# Patient Record
Sex: FEMALE | Race: BLACK OR AFRICAN AMERICAN | Employment: FULL TIME | URBAN - METROPOLITAN AREA
[De-identification: names, ages, dates, MRNs, and addresses within clinical notes are randomized per-mention and may not be internally consistent; named-entity substitution may affect disease eponyms.]

---

## 2017-12-31 ENCOUNTER — HOSPITAL ENCOUNTER (EMERGENCY)
Age: 42
Discharge: HOME | End: 2017-12-31
Attending: EMERGENCY MEDICINE
Payer: COMMERCIAL

## 2017-12-31 VITALS
SYSTOLIC BLOOD PRESSURE: 150 MMHG | WEIGHT: 158 LBS | RESPIRATION RATE: 16 BRPM | TEMPERATURE: 98.7 F | DIASTOLIC BLOOD PRESSURE: 101 MMHG | OXYGEN SATURATION: 99 % | HEIGHT: 59 IN | HEART RATE: 85 BPM | BODY MASS INDEX: 31.85 KG/M2
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
## 2017-12-31 DIAGNOSIS — S16.1XXA STRAIN OF NECK MUSCLE, INITIAL ENCOUNTER: Primary | ICD-10-CM

## 2017-12-31 PROCEDURE — 99282 EMERGENCY DEPT VISIT SF MDM: CPT

## 2017-12-31 RX ORDER — CELECOXIB 200 MG/1
200 CAPSULE ORAL DAILY
COMMUNITY

## 2017-12-31 RX ORDER — LANOLIN ALCOHOL/MO/W.PET/CERES
1000 CREAM (GRAM) TOPICAL DAILY
COMMUNITY

## 2017-12-31 RX ORDER — OMEPRAZOLE 10 MG/1
10 CAPSULE, DELAYED RELEASE ORAL DAILY
COMMUNITY
End: 2018-04-12

## 2017-12-31 RX ORDER — CYCLOBENZAPRINE HCL 10 MG
10 TABLET ORAL
Qty: 7 TAB | Refills: 0 | Status: SHIPPED | OUTPATIENT
Start: 2017-12-31 | End: 2018-01-07
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
## 2017-12-31 NOTE — LETTER
[facility] [place] 
[facility] 
[address] [address] 
[phone] Work/School Note Date: 12/31/2017 To Whom It May concern: Jimmie Barney was seen and treated today in the emergency room by the following provider(s): 
Attending Provider: Adriana Amato MD 
Physician Assistant: Sahil Younger. Jimmie Barney may return to work on 1/1/18.  
 
Sincerely, 
 
 
 
 
 
 
[employer], MANDO

## 2019-12-30 ENCOUNTER — HOSPITAL ENCOUNTER (EMERGENCY)
Age: 44
Discharge: HOME | End: 2019-12-30
Attending: EMERGENCY MEDICINE
Payer: MEDICAID

## 2019-12-30 VITALS
HEART RATE: 89 BPM | HEIGHT: 57 IN | OXYGEN SATURATION: 100 % | RESPIRATION RATE: 16 BRPM | DIASTOLIC BLOOD PRESSURE: 74 MMHG | TEMPERATURE: 98.8 F | SYSTOLIC BLOOD PRESSURE: 126 MMHG | WEIGHT: 143 LBS | BODY MASS INDEX: 30.85 KG/M2

## 2019-12-30 DIAGNOSIS — J01.90 ACUTE NON-RECURRENT SINUSITIS, UNSPECIFIED LOCATION: Primary | ICD-10-CM

## 2019-12-30 PROCEDURE — 99282 EMERGENCY DEPT VISIT SF MDM: CPT

## 2019-12-30 RX ORDER — AMOXICILLIN AND CLAVULANATE POTASSIUM 875; 125 MG/1; MG/1
1 TABLET, FILM COATED ORAL 2 TIMES DAILY
Qty: 20 TAB | Refills: 0 | Status: SHIPPED | OUTPATIENT
Start: 2019-12-30 | End: 2020-01-09

## 2019-12-30 NOTE — LETTER
[facility] [place] 
[facility] 
[address] [facility] [address] [phone] Work/School Note Date: 12/30/2019 To Whom It May concern: Kelsey Hurst was seen and treated today in the emergency room by the following provider(s): 
Attending Provider: aYsmeen Locke MD 
Physician Assistant: EDDIE Pa. Kelsey Hurst may return to work on 1/1/20.  
 
Sincerely, 
 
 
 
 
Derrel EDDIE Reeder

## 2019-12-30 NOTE — DISCHARGE INSTRUCTIONS
Patient Education        Sinusitis: Care Instructions  Your Care Instructions    Sinusitis is an infection of the lining of the sinus cavities in your head. Sinusitis often follows a cold. It causes pain and pressure in your head and face. In most cases, sinusitis gets better on its own in 1 to 2 weeks. But some mild symptoms may last for several weeks. Sometimes antibiotics are needed. Follow-up care is a key part of your treatment and safety. Be sure to make and go to all appointments, and call your doctor if you are having problems. It's also a good idea to know your test results and keep a list of the medicines you take. How can you care for yourself at home? · Take an over-the-counter pain medicine, such as acetaminophen (Tylenol), ibuprofen (Advil, Motrin), or naproxen (Aleve). Read and follow all instructions on the label. · If the doctor prescribed antibiotics, take them as directed. Do not stop taking them just because you feel better. You need to take the full course of antibiotics. · Be careful when taking over-the-counter cold or flu medicines and Tylenol at the same time. Many of these medicines have acetaminophen, which is Tylenol. Read the labels to make sure that you are not taking more than the recommended dose. Too much acetaminophen (Tylenol) can be harmful. · Breathe warm, moist air from a steamy shower, a hot bath, or a sink filled with hot water. Avoid cold, dry air. Using a humidifier in your home may help. Follow the directions for cleaning the machine. · Use saline (saltwater) nasal washes to help keep your nasal passages open and wash out mucus and bacteria. You can buy saline nose drops at a grocery store or drugstore. Or you can make your own at home by adding 1 teaspoon of salt and 1 teaspoon of baking soda to 2 cups of distilled water. If you make your own, fill a bulb syringe with the solution, insert the tip into your nostril, and squeeze gently. Nyoka Mano your nose.   · Put a hot, wet towel or a warm gel pack on your face 3 or 4 times a day for 5 to 10 minutes each time. · Try a decongestant nasal spray like oxymetazoline (Afrin). Do not use it for more than 3 days in a row. Using it for more than 3 days can make your congestion worse. When should you call for help? Call your doctor now or seek immediate medical care if:    · You have new or worse swelling or redness in your face or around your eyes.     · You have a new or higher fever.    Watch closely for changes in your health, and be sure to contact your doctor if:    · You have new or worse facial pain.     · The mucus from your nose becomes thicker (like pus) or has new blood in it.     · You are not getting better as expected. Where can you learn more? Go to http://mike-kell.info/. Enter M709 in the search box to learn more about \"Sinusitis: Care Instructions. \"  Current as of: October 21, 2018  Content Version: 12.2  © 3665-2954 [employer]. Care instructions adapted under license by [facility] [employer] [facility] (which disclaims liability or warranty for this information). If you have questions about a medical condition or this instruction, always ask your healthcare professional. [address] any warranty or liability for your use of this information.

## 2019-12-30 NOTE — ED TRIAGE NOTES
Pt states in written form \" I think I have a sinus infection I have a headache , yellow green phlegm with cough chest pain and feels hard to breath. I have body aches sweats sore throat,  fatique, lost my voice also diarrhea that started thursday.

## 2019-12-30 NOTE — ED PROVIDER NOTES
EMERGENCY DEPARTMENT HISTORY AND PHYSICAL EXAM    Date: 12/30/2019  Patient Name: Santos Scherer    History of Presenting Illness     Chief Complaint   Patient presents with    Sinus Infection         History Provided By: Patient    10:34 AM  Santos Scherer is a 38 y.o. female with PMHX of migraines rheumatoid arthritis, Optitz-Sarah syndrome who presents to the emergency department C/O nasal congestion, postnasal drip and cough and subjective fever and body aches onset 5 days ago. Patient states her nasal discharge was initially clear but now yellow-green and today was tinged with blood. She reports history of 1-2 sinus infections per year but has not required any sinus surgeries. She states most of her symptoms are improving but has worsening sinus pressure and pain. Has been taking Flonase and over-the-counter cold medications without relief. Pt denies fever, vomiting, sore throat, and any other sxs or complaints. PCP: Bianca Castellano MD    Current Outpatient Medications   Medication Sig Dispense Refill    amoxicillin-clavulanate (AUGMENTIN) 875-125 mg per tablet Take 1 Tab by mouth two (2) times a day for 10 days. 20 Tab 0    tolterodine ER (DETROL LA) 4 mg ER capsule Take 1 Cap by mouth daily. Indications: Urine Leakage When there is a Strong Desire to Void 90 Cap 3    busPIRone (BUSPAR) 5 mg tablet TAKE 1 TABLET BY MOUTH 3 TIMES DAILY. 5    fluticasone (VERAMYST) 27.5 mcg/actuation nasal spray 55 mcg.  amitriptyline (ELAVIL) 10 mg tablet TAKE 1 TABLET BY MOUTH EVERY NIGHT AT BEDTIME  4    ergocalciferol (ERGOCALCIFEROL) 50,000 unit capsule TAKE ONE CAPSULE BY MOUTH EVERY WEEK  3    norethindrone-e.estradiol-iron (LO LOESTRIN FE) 1 mg-10 mcg (24)/10 mcg (2) tab Take 1 Tab by mouth daily.  celecoxib (CELEBREX) 200 mg capsule Take 200 mg by mouth daily.  cyanocobalamin 1,000 mcg tablet Take 1,000 mcg by mouth daily.       albuterol (PROVENTIL HFA, VENTOLIN HFA, PROAIR HFA) 90 mcg/actuation inhaler Take  by inhalation.  fexofenadine (ALLEGRA) 180 mg tablet Take  by mouth daily. Past History     Past Medical History:  Past Medical History:   Diagnosis Date    Asthma     Blurred vision     Carpal tunnel syndrome     GERD (gastroesophageal reflux disease)     Hearing loss     IBS (irritable bowel syndrome)     Kidney stones     Opitz-Sarah syndrome     Other ill-defined conditions(799.89)     sinus, arthritis, bone disease    PAD (peripheral artery disease) (HCC)     RA (rheumatoid arthritis) ([address]     Urinary frequency     Urinary urgency     Varicose vein        Past Surgical History:  Past Surgical History:   Procedure Laterality Date    HX CARPAL TUNNEL RELEASE      HX CATARACT REMOVAL      HX HERNIA REPAIR      HX UROLOGICAL      Biopsy of urethra       Family History:  History reviewed. No pertinent family history. Social History:  Social History     Tobacco Use    Smoking status: Never Smoker    Smokeless tobacco: Never Used   Substance Use Topics    Alcohol use: No     Comment: occ    Drug use: No       Allergies: Allergies   Allergen Reactions    Coconut Rash and Itching     GI distress      Coconut      Other reaction(s): gi distress, mild rash/itching    Coconut Oil Other (comments)     Other reaction(s): gi distress, mild rash/itching    Nuts [Tree Nut] Other (comments)     States found out in allergy testing    Pecan Extract Other (comments)     Other reaction(s): unknown  Positive reaction to allergy skin testing    Pecan Nut Unknown (comments)     Positive reaction to allergy skin testing    Tree Nuts Other (comments)     Other reaction(s): Other (comments)  States found out in allergy testing         Review of Systems   Review of Systems   Constitutional: Positive for chills and fever. HENT: Positive for congestion, sinus pressure and sinus pain. Negative for sore throat. Respiratory: Positive for cough.     All other systems reviewed and are negative. Physical Exam     Vitals:    12/30/19 0944   BP: 126/74   Pulse: 89   Resp: 16   Temp: 98.8 °F (37.1 °C)   SpO2: 100%   Weight: 64.9 kg (143 lb)   Height: 4' 9\" (1.448 m)     Physical Exam  Vital signs and nursing notes reviewed. CONSTITUTIONAL: Alert. Well-appearing; well-nourished; in no apparent distress. HEAD: Normocephalic; atraumatic. EYES: PERRL; Conjunctiva clear. ENT: TM's normal. External ear normal. Normal nose; no rhinorrhea. Normal pharynx. Moist mucus membranes. Bilateral maxillary sinus tenderness. NECK: Supple; FROM without difficulty, non-tender; no cervical lymphadenopathy. CV: Normal S1, S2; no murmurs, rubs, or gallops. No chest wall tenderness. RESPIRATORY: Normal chest excursion with respiration; breath sounds clear and equal bilaterally; no wheezes, rhonchi, or rales. SKIN: Normal for age and race; warm; dry; good turgor; no apparent lesions or exudate. NEURO: A & O x3. PSYCH:  Mood and affect appropriate. Diagnostic Study Results     Labs -   No results found for this or any previous visit (from the past 12 hour(s)). Radiologic Studies -   No orders to display     CT Results  (Last 48 hours)    None        CXR Results  (Last 48 hours)    None          Medications given in the ED-  Medications - No data to display      Medical Decision Making   I am the first provider for this patient. I reviewed the vital signs, available nursing notes, past medical history, past surgical history, family history and social history. Vital Signs-Reviewed the patient's vital signs. Records Reviewed: Nursing Notes      Procedures:  Procedures    ED Course:  10:34 AM   Initial assessment performed. The patients presenting problems have been discussed, and they are in agreement with the care plan formulated and outlined with them. I have encouraged them to ask questions as they arise throughout their visit.         Provider Notes (Medical Decision Making): Diamond Bishop is a 38 y.o. female with 5 days of URI symptoms. Now with increasing sinus pain and pressure and colored discharge from nose. Her vitals are stable and exam unremarkable except for some maxillary sinus tenderness. For this reason we will have her continue Flonase, over-the-counter decongestants and expectorants and start on Augmentin for possible bacterial sinusitis. Follow-up with PCP. Diagnosis and Disposition       DISCHARGE NOTE:    Margy King's  results have been reviewed with her. She has been counseled regarding her diagnosis, treatment, and plan. She verbally conveys understanding and agreement of the signs, symptoms, diagnosis, treatment and prognosis and additionally agrees to follow up as discussed. She also agrees with the care-plan and conveys that all of her questions have been answered. I have also provided discharge instructions for her that include: educational information regarding their diagnosis and treatment, and list of reasons why they would want to return to the ED prior to their follow-up appointment, should her condition change. She has been provided with education for proper emergency department utilization. CLINICAL IMPRESSION:    1. Acute non-recurrent sinusitis, unspecified location        PLAN:  1. D/C Home  2. Current Discharge Medication List      START taking these medications    Details   amoxicillin-clavulanate (AUGMENTIN) 875-125 mg per tablet Take 1 Tab by mouth two (2) times a day for 10 days. Qty: 20 Tab, Refills: 0           3.    Follow-up Information     Follow up With Specialties Details Why Contact Info    Elenita Gallo MD [facility] Schedule an appointment as soon as possible for a visit  [address] [place] [address] EMERGENCY DEPT Emergency Medicine  As needed, If symptoms worsen [address] [address] [phone] _______________________________      Please note that this dictation was completed with [employer], the computer voice recognition software. Quite often unanticipated grammatical, syntax, homophones, and other interpretive errors are inadvertently transcribed by the computer software. Please disregard these errors. Please excuse any errors that have escaped final proofreading.

## 2020-12-28 ENCOUNTER — HOSPITAL ENCOUNTER (EMERGENCY)
Age: 45
Discharge: HOME | End: 2020-12-28
Attending: EMERGENCY MEDICINE
Payer: MEDICAID

## 2020-12-28 ENCOUNTER — APPOINTMENT (OUTPATIENT)
Dept: GENERAL RADIOLOGY | Age: 45
End: 2020-12-28
Attending: EMERGENCY MEDICINE
Payer: MEDICAID

## 2020-12-28 VITALS
WEIGHT: 150 LBS | HEART RATE: 90 BPM | DIASTOLIC BLOOD PRESSURE: 63 MMHG | HEIGHT: 59 IN | RESPIRATION RATE: 27 BRPM | SYSTOLIC BLOOD PRESSURE: 105 MMHG | BODY MASS INDEX: 30.24 KG/M2 | OXYGEN SATURATION: 100 % | TEMPERATURE: 98.8 F

## 2020-12-28 DIAGNOSIS — N30.00 ACUTE CYSTITIS WITHOUT HEMATURIA: Primary | ICD-10-CM

## 2020-12-28 DIAGNOSIS — E87.6 HYPOKALEMIA: ICD-10-CM

## 2020-12-28 LAB
ALBUMIN SERPL-MCNC: 3.6 G/DL (ref 3.4–5)
ALBUMIN/GLOB SERPL: 0.9 {RATIO} (ref 0.8–1.7)
ALP SERPL-CCNC: 73 U/L (ref 45–117)
ALT SERPL-CCNC: 43 U/L (ref 13–56)
ANION GAP SERPL CALC-SCNC: 6 MMOL/L (ref 3–18)
APPEARANCE UR: CLEAR
AST SERPL-CCNC: 28 U/L (ref 10–38)
BACTERIA URNS QL MICRO: ABNORMAL /HPF
BASOPHILS # BLD: 0 K/UL (ref 0–0.1)
BASOPHILS NFR BLD: 0 % (ref 0–2)
BILIRUB SERPL-MCNC: 0.2 MG/DL (ref 0.2–1)
BILIRUB UR QL: NEGATIVE
BUN SERPL-MCNC: 9 MG/DL (ref 7–18)
BUN/CREAT SERPL: 13 (ref 12–20)
CALCIUM SERPL-MCNC: 9.1 MG/DL (ref 8.5–10.1)
CHLORIDE SERPL-SCNC: 102 MMOL/L (ref 100–111)
CK MB CFR SERPL CALC: NORMAL % (ref 0–4)
CK MB SERPL-MCNC: <1 NG/ML (ref 5–25)
CK SERPL-CCNC: 90 U/L (ref 26–192)
CO2 SERPL-SCNC: 30 MMOL/L (ref 21–32)
COLOR UR: ABNORMAL
CREAT SERPL-MCNC: 0.71 MG/DL (ref 0.6–1.3)
DIFFERENTIAL METHOD BLD: ABNORMAL
EOSINOPHIL # BLD: 0 K/UL (ref 0–0.4)
EOSINOPHIL NFR BLD: 0 % (ref 0–5)
EPITH CASTS URNS QL MICRO: ABNORMAL /LPF (ref 0–5)
ERYTHROCYTE [DISTWIDTH] IN BLOOD BY AUTOMATED COUNT: 13.3 % (ref 11.6–14.5)
GLOBULIN SER CALC-MCNC: 4.2 G/DL (ref 2–4)
GLUCOSE SERPL-MCNC: 107 MG/DL (ref 74–99)
GLUCOSE UR STRIP.AUTO-MCNC: 100 MG/DL
HCT VFR BLD AUTO: 42.5 % (ref 35–45)
HGB BLD-MCNC: 15 G/DL (ref 12–16)
HGB UR QL STRIP: ABNORMAL
KETONES UR QL STRIP.AUTO: 40 MG/DL
LEUKOCYTE ESTERASE UR QL STRIP.AUTO: NEGATIVE
LIPASE SERPL-CCNC: 141 U/L (ref 73–393)
LYMPHOCYTES # BLD: 2 K/UL (ref 0.9–3.6)
LYMPHOCYTES NFR BLD: 36 % (ref 21–52)
MCH RBC QN AUTO: 32.4 PG (ref 24–34)
MCHC RBC AUTO-ENTMCNC: 35.3 G/DL (ref 31–37)
MCV RBC AUTO: 91.8 FL (ref 74–97)
MONOCYTES # BLD: 0.6 K/UL (ref 0.05–1.2)
MONOCYTES NFR BLD: 10 % (ref 3–10)
MUCOUS THREADS URNS QL MICRO: ABNORMAL /LPF
NEUTS SEG # BLD: 3 K/UL (ref 1.8–8)
NEUTS SEG NFR BLD: 54 % (ref 40–73)
NITRITE UR QL STRIP.AUTO: NEGATIVE
PH UR STRIP: 6.5 [PH] (ref 5–8)
PLATELET # BLD AUTO: 235 K/UL (ref 135–420)
PMV BLD AUTO: 9.1 FL (ref 9.2–11.8)
POTASSIUM SERPL-SCNC: 3.2 MMOL/L (ref 3.5–5.5)
PROT SERPL-MCNC: 7.8 G/DL (ref 6.4–8.2)
PROT UR STRIP-MCNC: ABNORMAL MG/DL
RBC # BLD AUTO: 4.63 M/UL (ref 4.2–5.3)
RBC #/AREA URNS HPF: ABNORMAL /HPF (ref 0–5)
SODIUM SERPL-SCNC: 138 MMOL/L (ref 136–145)
SP GR UR REFRACTOMETRY: 1.02 (ref 1–1.03)
TROPONIN I SERPL-MCNC: <0.02 NG/ML (ref 0–0.04)
UROBILINOGEN UR QL STRIP.AUTO: 1 EU/DL (ref 0.2–1)
WBC # BLD AUTO: 5.5 K/UL (ref 4.6–13.2)
WBC URNS QL MICRO: ABNORMAL /HPF (ref 0–5)

## 2020-12-28 PROCEDURE — 99285 EMERGENCY DEPT VISIT HI MDM: CPT

## 2020-12-28 PROCEDURE — 96361 HYDRATE IV INFUSION ADD-ON: CPT

## 2020-12-28 PROCEDURE — 85025 COMPLETE CBC W/AUTO DIFF WBC: CPT

## 2020-12-28 PROCEDURE — 74011250637 HC RX REV CODE- 250/637: Performed by: EMERGENCY MEDICINE

## 2020-12-28 PROCEDURE — 74011250636 HC RX REV CODE- 250/636: Performed by: EMERGENCY MEDICINE

## 2020-12-28 PROCEDURE — 81001 URINALYSIS AUTO W/SCOPE: CPT

## 2020-12-28 PROCEDURE — 71045 X-RAY EXAM CHEST 1 VIEW: CPT

## 2020-12-28 PROCEDURE — 96374 THER/PROPH/DIAG INJ IV PUSH: CPT

## 2020-12-28 PROCEDURE — 80053 COMPREHEN METABOLIC PANEL: CPT

## 2020-12-28 PROCEDURE — 93005 ELECTROCARDIOGRAM TRACING: CPT

## 2020-12-28 PROCEDURE — 82550 ASSAY OF CK (CPK): CPT

## 2020-12-28 PROCEDURE — 83690 ASSAY OF LIPASE: CPT

## 2020-12-28 RX ORDER — CEPHALEXIN 500 MG/1
500 CAPSULE ORAL 2 TIMES DAILY
Qty: 14 CAP | Refills: 0 | Status: SHIPPED | OUTPATIENT
Start: 2020-12-28 | End: 2021-01-04

## 2020-12-28 RX ORDER — METOCLOPRAMIDE HYDROCHLORIDE 5 MG/ML
10 INJECTION INTRAMUSCULAR; INTRAVENOUS
Status: COMPLETED | OUTPATIENT
Start: 2020-12-28 | End: 2020-12-28

## 2020-12-28 RX ORDER — POTASSIUM CHLORIDE 1500 MG/1
40 TABLET, FILM COATED, EXTENDED RELEASE ORAL DAILY
Qty: 14 TAB | Refills: 0 | Status: SHIPPED | OUTPATIENT
Start: 2020-12-28 | End: 2021-01-04

## 2020-12-28 RX ORDER — DICYCLOMINE HYDROCHLORIDE 10 MG/1
20 CAPSULE ORAL
Status: COMPLETED | OUTPATIENT
Start: 2020-12-28 | End: 2020-12-28

## 2020-12-28 RX ORDER — ACETAMINOPHEN 500 MG
1000 TABLET ORAL
Status: COMPLETED | OUTPATIENT
Start: 2020-12-28 | End: 2020-12-28

## 2020-12-28 RX ADMIN — SODIUM CHLORIDE 1000 ML: 900 INJECTION, SOLUTION INTRAVENOUS at 10:58

## 2020-12-28 RX ADMIN — METOCLOPRAMIDE 10 MG: 5 INJECTION, SOLUTION INTRAMUSCULAR; INTRAVENOUS at 11:35

## 2020-12-28 RX ADMIN — DICYCLOMINE HYDROCHLORIDE 20 MG: 10 CAPSULE ORAL at 11:35

## 2020-12-28 RX ADMIN — ACETAMINOPHEN 1000 MG: 500 TABLET ORAL at 11:59

## 2020-12-28 NOTE — ED TRIAGE NOTES
Pt w/ c/o recurrent fever, cough, mid abdominal pain, nausea and headache x1 week. 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 Pt was seen on Sat at [facility] and tested neg for Flu but has had recurrent fevers of 101, treated w/ tylenol and motrin. Pt reports fever this morning of 100 but did not take any medications PTA. Pt adds, while walking to ED bed,SOB, worsening w/ exertion and intermittent chest pain.

## 2020-12-28 NOTE — ED PROVIDER NOTES
EMERGENCY DEPARTMENT HISTORY AND PHYSICAL EXAM    Date: 12/28/2020  Patient Name: Leon Rodriguez    History of Presenting Illness     Chief Complaint   Patient presents with    Fever       History Provided By: Patient     History Joetatiannagreta Barba):   10:40 AM  Leon Rodriguez is a 41 y.o. female with PMHX of Asthma, GERD, IBS, Leverne Javi Sarah syndrome, peripheral arterial disease, RA who presents to the emergency department C/O fever onset 1 week ago. Associated sxs include headache, nausea. Pt denies any other sxs or complaints. Chief Complaint: Fever  Duration: 1 week  Timing: Acute  Location: Generalized  Quality: Warm  Severity: Moderate  Modifying Factors: Nothing makes it better, or worse. Associated Symptoms: Headache, nausea    PCP: Scarlet Carl MD     Current Outpatient Medications   Medication Sig Dispense Refill    cephALEXin (Keflex) 500 mg capsule Take 1 Cap by mouth two (2) times a day for 7 days. 14 Cap 0    potassium chloride SR (K-TAB) 20 mEq tablet Take 2 Tabs by mouth daily for 7 days. 14 Tab 0    tolterodine ER (DETROL LA) 4 mg ER capsule Take 1 Cap by mouth daily. Indications: Urine Leakage When there is a Strong Desire to Void 90 Cap 3    busPIRone (BUSPAR) 5 mg tablet TAKE 1 TABLET BY MOUTH 3 TIMES DAILY. 5    fluticasone (VERAMYST) 27.5 mcg/actuation nasal spray 55 mcg.  amitriptyline (ELAVIL) 10 mg tablet TAKE 1 TABLET BY MOUTH EVERY NIGHT AT BEDTIME  4    ergocalciferol (ERGOCALCIFEROL) 50,000 unit capsule TAKE ONE CAPSULE BY MOUTH EVERY WEEK  3    norethindrone-e.estradiol-iron (LO LOESTRIN FE) 1 mg-10 mcg (24)/10 mcg (2) tab Take 1 Tab by mouth daily.  celecoxib (CELEBREX) 200 mg capsule Take 200 mg by mouth daily.  cyanocobalamin 1,000 mcg tablet Take 1,000 mcg by mouth daily.  albuterol (PROVENTIL HFA, VENTOLIN HFA, PROAIR HFA) 90 mcg/actuation inhaler Take  by inhalation.  fexofenadine (ALLEGRA) 180 mg tablet Take  by mouth daily.          Past History Past Medical History:  Past Medical History:   Diagnosis Date    Asthma     Blurred vision     Carpal tunnel syndrome     GERD (gastroesophageal reflux disease)     Hearing loss     IBS (irritable bowel syndrome)     Kidney stones     Opitz-Sarah syndrome     Other ill-defined conditions(799.89)     sinus, arthritis, bone disease    [facility]     RA (rheumatoid arthritis) ([facility]     Urinary frequency     Urinary urgency     Varicose vein        Past Surgical History:  Past Surgical History:   Procedure Laterality Date    HX CARPAL TUNNEL RELEASE      HX CATARACT REMOVAL      HX HERNIA REPAIR      HX UROLOGICAL      Biopsy of urethra       Family History:  History reviewed. No pertinent family history. Social History:  Social History     Tobacco Use    Smoking status: Never Smoker    Smokeless tobacco: Never Used   Substance Use Topics    Alcohol use: Not Currently     Comment: occ    Drug use: No       Allergies: Allergies   Allergen Reactions    Coconut Rash and Itching     GI distress      Coconut      Other reaction(s): gi distress, mild rash/itching    Coconut Oil Other (comments)     Other reaction(s): gi distress, mild rash/itching    Nuts [Tree Nut] Other (comments)     States found out in allergy testing    Pecan Extract Other (comments)     Other reaction(s): unknown  Positive reaction to allergy skin testing    Pecan Nut Unknown (comments)     Positive reaction to allergy skin testing    Tree Nuts Other (comments)     Other reaction(s): Other (comments)  States found out in allergy testing         Review of Systems     Review of Systems   Constitutional: Positive for fever. Negative for chills. HENT: Negative for congestion, rhinorrhea and sore throat. Eyes: Negative for pain and visual disturbance. Respiratory: Negative for cough, shortness of breath and wheezing. Cardiovascular: Negative for chest pain and palpitations. Gastrointestinal: Positive for nausea. Negative for abdominal pain, diarrhea and vomiting. Genitourinary: Negative for dysuria, flank pain, frequency and urgency. Musculoskeletal: Negative for arthralgias and myalgias. Skin: Negative for rash and wound. Neurological: Positive for headaches. Negative for speech difficulty, weakness and light-headedness. Psychiatric/Behavioral: Negative for agitation and confusion. All other systems reviewed and are negative. Physical Exam     Vitals:    12/28/20 1221 12/28/20 1222 12/28/20 1223 12/28/20 1224   BP:       Pulse: 93 92 90 85   Resp: 27 29 25 26   Temp:       SpO2: 100% 100% 100% 100%   Weight:       Height:           Physical Exam  Vitals signs and nursing note reviewed. Constitutional:       General: She is not in acute distress. Appearance: Normal appearance. She is normal weight. She is not ill-appearing. HENT:      Head: Normocephalic and atraumatic. Nose: Nose normal. No rhinorrhea. Mouth/Throat:      Mouth: Mucous membranes are moist.      Pharynx: No oropharyngeal exudate or posterior oropharyngeal erythema. Eyes:      Extraocular Movements: Extraocular movements intact. Conjunctiva/sclera: Conjunctivae normal.      Pupils: Pupils are equal, round, and reactive to light. Neck:      Musculoskeletal: Normal range of motion and neck supple. No neck rigidity. Cardiovascular:      Rate and Rhythm: Normal rate and regular rhythm. Heart sounds: No murmur. No friction rub. No gallop. Pulmonary:      Effort: Pulmonary effort is normal. No respiratory distress. Breath sounds: Normal breath sounds. No wheezing, rhonchi or rales. Abdominal:      General: Bowel sounds are normal.      Palpations: Abdomen is soft. Tenderness: There is no abdominal tenderness. There is no guarding or rebound. Musculoskeletal: Normal range of motion. General: No swelling, tenderness or deformity.    Lymphadenopathy: Cervical: No cervical adenopathy. Skin:     General: Skin is warm and dry. Findings: No rash. Neurological:      General: No focal deficit present. Mental Status: She is alert and oriented to person, place, and time. Psychiatric:         Mood and Affect: Mood normal.         Behavior: Behavior normal.         Diagnostic Study Results     Labs -     Recent Results (from the past 12 hour(s))   EKG, 12 LEAD, INITIAL    Collection Time: 12/28/20 10:11 AM   Result Value Ref Range    Ventricular Rate 110 BPM    Atrial Rate 110 BPM    P-R Interval 150 ms    QRS Duration 76 ms    Q-T Interval 336 ms    QTC Calculation (Bezet) 454 ms    Calculated P Axis 58 degrees    Calculated R Axis 17 degrees    Calculated T Axis 30 degrees    Diagnosis       Sinus tachycardia  Otherwise normal ECG  When compared with ECG of 22-MAR-2010 10:20,  No significant change was found     CBC WITH AUTOMATED DIFF    Collection Time: 12/28/20 10:22 AM   Result Value Ref Range    WBC 5.5 4.6 - 13.2 K/uL    RBC 4.63 4.20 - 5.30 M/uL    HGB 15.0 12.0 - 16.0 g/dL    HCT 42.5 35.0 - 45.0 %    MCV 91.8 74.0 - 97.0 FL    MCH 32.4 24.0 - 34.0 PG    MCHC 35.3 31.0 - 37.0 g/dL    RDW 13.3 11.6 - 14.5 %    PLATELET 758 887 - 565 K/uL    MPV 9.1 (L) 9.2 - 11.8 FL    NEUTROPHILS 54 40 - 73 %    LYMPHOCYTES 36 21 - 52 %    MONOCYTES 10 3 - 10 %    EOSINOPHILS 0 0 - 5 %    BASOPHILS 0 0 - 2 %    ABS. NEUTROPHILS 3.0 1.8 - 8.0 K/UL    ABS. LYMPHOCYTES 2.0 0.9 - 3.6 K/UL    ABS. MONOCYTES 0.6 0.05 - 1.2 K/UL    ABS. EOSINOPHILS 0.0 0.0 - 0.4 K/UL    ABS.  BASOPHILS 0.0 0.0 - 0.1 K/UL    DF AUTOMATED     METABOLIC PANEL, COMPREHENSIVE    Collection Time: 12/28/20 10:22 AM   Result Value Ref Range    Sodium 138 136 - 145 mmol/L    Potassium 3.2 (L) 3.5 - 5.5 mmol/L    Chloride 102 100 - 111 mmol/L    CO2 30 21 - 32 mmol/L    Anion gap 6 3.0 - 18 mmol/L    Glucose 107 (H) 74 - 99 mg/dL    BUN 9 7.0 - 18 MG/DL    Creatinine 0.71 0.6 - 1.3 MG/DL BUN/Creatinine ratio 13 12 - 20      GFR est AA >60 >60 ml/min/1.73m2    GFR est non-AA >60 >60 ml/min/1.73m2    Calcium 9.1 8.5 - 10.1 MG/DL    Bilirubin, total 0.2 0.2 - 1.0 MG/DL    ALT (SGPT) 43 13 - 56 U/L    AST (SGOT) 28 10 - 38 U/L    Alk. phosphatase 73 45 - 117 U/L    Protein, total 7.8 6.4 - 8.2 g/dL    Albumin 3.6 3.4 - 5.0 g/dL    Globulin 4.2 (H) 2.0 - 4.0 g/dL    A-G Ratio 0.9 0.8 - 1.7     CARDIAC PANEL,(CK, CKMB & TROPONIN)    Collection Time: 12/28/20 10:22 AM   Result Value Ref Range    CK - MB <1.0 <3.6 ng/ml    CK-MB Index  0.0 - 4.0 %     CALCULATION NOT PERFORMED WHEN RESULT IS BELOW LINEAR LIMIT    CK 90 26 - 192 U/L    Troponin-I, QT <0.02 0.0 - 0.045 NG/ML   LIPASE    Collection Time: 12/28/20 10:22 AM   Result Value Ref Range    Lipase 141 73 - 393 U/L   URINALYSIS W/ RFLX MICROSCOPIC    Collection Time: 12/28/20 11:32 AM   Result Value Ref Range    Color DARK YELLOW      Appearance CLEAR      Specific gravity 1.024 1.005 - 1.030      pH (UA) 6.5 5.0 - 8.0      Protein TRACE (A) NEG mg/dL    Glucose 100 (A) NEG mg/dL    Ketone 40 (A) NEG mg/dL    Bilirubin Negative NEG      Blood TRACE (A) NEG      Urobilinogen 1.0 0.2 - 1.0 EU/dL    Nitrites Negative NEG      Leukocyte Esterase Negative NEG     URINE MICROSCOPIC ONLY    Collection Time: 12/28/20 11:32 AM   Result Value Ref Range    WBC 0 to 3 0 - 5 /hpf    RBC 4 to 10 0 - 5 /hpf    Epithelial cells 1+ 0 - 5 /lpf    Bacteria FEW (A) NEG /hpf    Mucus 1+ (A) NEG /lpf       Radiologic Studies -   XR CHEST PORT   Final Result   IMPRESSION:      No acute radiographic cardiopulmonary abnormality. CT Results  (Last 48 hours)    None        CXR Results  (Last 48 hours)               12/28/20 1039  XR CHEST PORT Final result    Impression:  IMPRESSION:       No acute radiographic cardiopulmonary abnormality.         Narrative:  EXAM: XR CHEST PORT       CLINICAL INDICATION/HISTORY: cough   -Additional: None       COMPARISON: March 22, 2010 TECHNIQUE: Frontal view of the chest       _______________       FINDINGS:       HEART AND MEDIASTINUM: Normal cardiac size and mediastinal contours. LUNGS AND PLEURAL SPACES: No focal pneumonic consolidation, pneumothorax, or   pleural effusion. BONY THORAX AND SOFT TISSUES: No acute osseous abnormality       _______________                 Medications given in the ED-  Medications   sodium chloride 0.9 % bolus infusion 1,000 mL (1,000 mL IntraVENous New Bag 12/28/20 1058)   metoclopramide HCl (REGLAN) injection 10 mg (10 mg IntraVENous Given 12/28/20 1135)   dicyclomine (BENTYL) capsule 20 mg (20 mg Oral Given 12/28/20 1135)   acetaminophen (TYLENOL) tablet 1,000 mg (1,000 mg Oral Given 12/28/20 1159)         Medical Decision Making   I am the first provider for this patient. I reviewed the vital signs, available nursing notes, past medical history, past surgical history, family history and social history. Vital Signs-Reviewed the patient's vital signs. Pulse Oximetry Analysis - 100% on RA     Cardiac Monitor:  Rate: 114 bpm  Rhythm: sinus tachycardia    EKG interpretation: (Preliminary)  Rhythm: sinus tachycardia. Rate: 110 bpm; no STEMI  EKG read by Carroll Martin MD at 10:11 AM     Records Reviewed: Nursing Notes    Provider Notes (Medical Decision Making):   DDX: URI, pneumonia, intra-abdominal infection, electrolyte disorder, UTI    Procedures:  Procedures    ED Course:   10:40 AM Initial assessment performed. The patients presenting problems have been discussed, and they are in agreement with the care plan formulated and outlined with them. I have encouraged them to ask questions as they arise throughout their visit. Diagnosis and Disposition     Discussion:  41 y.o. female with 1 week of fever with headache and nausea. Patient had a UTI found on ED evaluation. This to be treated as an outpatient. No indication for admission or further evaluation in the ED.   Patient also had a mild hypokalemia which will also be treated as an outpatient. Patient will follow up with her primary care doctor as instructed. Patient understands and agrees with this plan. DISCHARGE NOTE:  12:42 PM   Wayne King's  results have been reviewed with her. She has been counseled regarding her diagnosis, treatment, and plan. She verbally conveys understanding and agreement of the signs, symptoms, diagnosis, treatment and prognosis and additionally agrees to follow up as discussed. She also agrees with the care-plan and conveys that all of her questions have been answered. I have also provided discharge instructions for her that include: educational information regarding their diagnosis and treatment, and list of reasons why they would want to return to the ED prior to their follow-up appointment, should her condition change. She has been provided with education for proper emergency department utilization. CLINICAL IMPRESSION:    1. Acute cystitis without hematuria    2. Hypokalemia        PLAN:  1. D/C Home  2. Current Discharge Medication List      START taking these medications    Details   cephALEXin (Keflex) 500 mg capsule Take 1 Cap by mouth two (2) times a day for 7 days. Qty: 14 Cap, Refills: 0      potassium chloride SR (K-TAB) 20 mEq tablet Take 2 Tabs by mouth daily for 7 days. Qty: 14 Tab, Refills: 0           3. Follow-up Information     Follow up With Specialties Details Why Contact Info    Lee Dumont MD [facility] Schedule an appointment as soon as possible for a visit in 1 week For follow up from Emergency Department visit. [address] [facility] [address]  [phone]      [facility] EMERGENCY DEPT Emergency Medicine  As needed; If symptoms worsen [address] [address] [address]     Please note that this dictation was completed with [employer], the [employer] voice recognition software.   Quite often unanticipated grammatical, syntax, homophones, and other interpretive errors are inadvertently transcribed by the computer software. Please disregard these errors. Please excuse any errors that have escaped final proofreading.     Briana Barrera MD

## 2020-12-29 LAB
ATRIAL RATE: 110 BPM
CALCULATED P AXIS, ECG09: 58 DEGREES
CALCULATED R AXIS, ECG10: 17 DEGREES
CALCULATED T AXIS, ECG11: 30 DEGREES
DIAGNOSIS, 93000: NORMAL
P-R INTERVAL, ECG05: 150 MS
Q-T INTERVAL, ECG07: 336 MS
QRS DURATION, ECG06: 76 MS
QTC CALCULATION (BEZET), ECG08: 454 MS
VENTRICULAR RATE, ECG03: 110 BPM

## 2021-01-07 ENCOUNTER — APPOINTMENT (OUTPATIENT)
Dept: GENERAL RADIOLOGY | Age: 46
End: 2021-01-07
Attending: PHYSICIAN ASSISTANT
Payer: MEDICAID

## 2021-01-07 ENCOUNTER — HOSPITAL ENCOUNTER (EMERGENCY)
Age: 46
Discharge: HOME | End: 2021-01-07
Attending: EMERGENCY MEDICINE
Payer: MEDICAID

## 2021-01-07 VITALS
BODY MASS INDEX: 30.24 KG/M2 | WEIGHT: 150 LBS | OXYGEN SATURATION: 99 % | DIASTOLIC BLOOD PRESSURE: 69 MMHG | TEMPERATURE: 97.1 F | SYSTOLIC BLOOD PRESSURE: 106 MMHG | HEART RATE: 76 BPM | HEIGHT: 59 IN | RESPIRATION RATE: 23 BRPM

## 2021-01-07 DIAGNOSIS — U07.1 COVID-19 VIRUS INFECTION: ICD-10-CM

## 2021-01-07 DIAGNOSIS — J45.41 MODERATE PERSISTENT ASTHMA WITH ACUTE EXACERBATION: Primary | ICD-10-CM

## 2021-01-07 DIAGNOSIS — J98.01 ACUTE BRONCHOSPASM: ICD-10-CM

## 2021-01-07 LAB
ALBUMIN SERPL-MCNC: 3.5 G/DL (ref 3.4–5)
ALBUMIN/GLOB SERPL: 0.8 {RATIO} (ref 0.8–1.7)
ALP SERPL-CCNC: 72 U/L (ref 45–117)
ALT SERPL-CCNC: 32 U/L (ref 13–56)
ANION GAP SERPL CALC-SCNC: 8 MMOL/L (ref 3–18)
APPEARANCE UR: CLEAR
AST SERPL-CCNC: 25 U/L (ref 10–38)
ATRIAL RATE: 84 BPM
BASOPHILS # BLD: 0 K/UL (ref 0–0.1)
BASOPHILS NFR BLD: 0 % (ref 0–2)
BILIRUB SERPL-MCNC: 0.3 MG/DL (ref 0.2–1)
BILIRUB UR QL: NEGATIVE
BUN SERPL-MCNC: 10 MG/DL (ref 7–18)
BUN/CREAT SERPL: 14 (ref 12–20)
CALCIUM SERPL-MCNC: 9.9 MG/DL (ref 8.5–10.1)
CALCULATED P AXIS, ECG09: 42 DEGREES
CALCULATED R AXIS, ECG10: 10 DEGREES
CALCULATED T AXIS, ECG11: 16 DEGREES
CHLORIDE SERPL-SCNC: 101 MMOL/L (ref 100–111)
CK MB CFR SERPL CALC: NORMAL % (ref 0–4)
CK MB SERPL-MCNC: <1 NG/ML (ref 5–25)
CK SERPL-CCNC: 87 U/L (ref 26–192)
CO2 SERPL-SCNC: 28 MMOL/L (ref 21–32)
COLOR UR: YELLOW
CREAT SERPL-MCNC: 0.73 MG/DL (ref 0.6–1.3)
D DIMER PPP FEU-MCNC: <0.27 UG/ML(FEU)
DIAGNOSIS, 93000: NORMAL
DIFFERENTIAL METHOD BLD: ABNORMAL
EOSINOPHIL # BLD: 0.1 K/UL (ref 0–0.4)
EOSINOPHIL NFR BLD: 1 % (ref 0–5)
ERYTHROCYTE [DISTWIDTH] IN BLOOD BY AUTOMATED COUNT: 12.5 % (ref 11.6–14.5)
GLOBULIN SER CALC-MCNC: 4.4 G/DL (ref 2–4)
GLUCOSE SERPL-MCNC: 130 MG/DL (ref 74–99)
GLUCOSE UR STRIP.AUTO-MCNC: 250 MG/DL
HCT VFR BLD AUTO: 41.6 % (ref 35–45)
HGB BLD-MCNC: 14.1 G/DL (ref 12–16)
HGB UR QL STRIP: NEGATIVE
KETONES UR QL STRIP.AUTO: NEGATIVE MG/DL
LEUKOCYTE ESTERASE UR QL STRIP.AUTO: NEGATIVE
LYMPHOCYTES # BLD: 2.7 K/UL (ref 0.9–3.6)
LYMPHOCYTES NFR BLD: 49 % (ref 21–52)
MAGNESIUM SERPL-MCNC: 2 MG/DL (ref 1.6–2.6)
MCH RBC QN AUTO: 31.6 PG (ref 24–34)
MCHC RBC AUTO-ENTMCNC: 33.9 G/DL (ref 31–37)
MCV RBC AUTO: 93.3 FL (ref 74–97)
MONOCYTES # BLD: 0.6 K/UL (ref 0.05–1.2)
MONOCYTES NFR BLD: 10 % (ref 3–10)
NEUTS SEG # BLD: 2.2 K/UL (ref 1.8–8)
NEUTS SEG NFR BLD: 40 % (ref 40–73)
NITRITE UR QL STRIP.AUTO: NEGATIVE
P-R INTERVAL, ECG05: 162 MS
PH UR STRIP: 7 [PH] (ref 5–8)
PLATELET # BLD AUTO: 450 K/UL (ref 135–420)
PMV BLD AUTO: 9.2 FL (ref 9.2–11.8)
POTASSIUM SERPL-SCNC: 3.4 MMOL/L (ref 3.5–5.5)
PROT SERPL-MCNC: 7.9 G/DL (ref 6.4–8.2)
PROT UR STRIP-MCNC: NEGATIVE MG/DL
Q-T INTERVAL, ECG07: 368 MS
QRS DURATION, ECG06: 78 MS
QTC CALCULATION (BEZET), ECG08: 434 MS
RBC # BLD AUTO: 4.46 M/UL (ref 4.2–5.3)
SODIUM SERPL-SCNC: 137 MMOL/L (ref 136–145)
SP GR UR REFRACTOMETRY: 1.01 (ref 1–1.03)
TROPONIN I SERPL-MCNC: <0.02 NG/ML (ref 0–0.04)
UROBILINOGEN UR QL STRIP.AUTO: 1 EU/DL (ref 0.2–1)
VENTRICULAR RATE, ECG03: 84 BPM
WBC # BLD AUTO: 5.5 K/UL (ref 4.6–13.2)

## 2021-01-07 PROCEDURE — 82553 CREATINE MB FRACTION: CPT

## 2021-01-07 PROCEDURE — 80053 COMPREHEN METABOLIC PANEL: CPT

## 2021-01-07 PROCEDURE — 85379 FIBRIN DEGRADATION QUANT: CPT

## 2021-01-07 PROCEDURE — 74011250636 HC RX REV CODE- 250/636: Performed by: PHYSICIAN ASSISTANT

## 2021-01-07 PROCEDURE — 74011250637 HC RX REV CODE- 250/637: Performed by: PHYSICIAN ASSISTANT

## 2021-01-07 PROCEDURE — 71045 X-RAY EXAM CHEST 1 VIEW: CPT

## 2021-01-07 PROCEDURE — 93005 ELECTROCARDIOGRAM TRACING: CPT

## 2021-01-07 PROCEDURE — 83735 ASSAY OF MAGNESIUM: CPT

## 2021-01-07 PROCEDURE — 96375 TX/PRO/DX INJ NEW DRUG ADDON: CPT

## 2021-01-07 PROCEDURE — 81003 URINALYSIS AUTO W/O SCOPE: CPT

## 2021-01-07 PROCEDURE — 99285 EMERGENCY DEPT VISIT HI MDM: CPT

## 2021-01-07 PROCEDURE — 96365 THER/PROPH/DIAG IV INF INIT: CPT

## 2021-01-07 PROCEDURE — 85025 COMPLETE CBC W/AUTO DIFF WBC: CPT

## 2021-01-07 RX ORDER — DEXAMETHASONE SODIUM PHOSPHATE 4 MG/ML
10 INJECTION, SOLUTION INTRA-ARTICULAR; INTRALESIONAL; INTRAMUSCULAR; INTRAVENOUS; SOFT TISSUE
Status: COMPLETED | OUTPATIENT
Start: 2021-01-07 | End: 2021-01-07

## 2021-01-07 RX ORDER — MAGNESIUM SULFATE HEPTAHYDRATE 40 MG/ML
2 INJECTION, SOLUTION INTRAVENOUS ONCE
Status: COMPLETED | OUTPATIENT
Start: 2021-01-07 | End: 2021-01-07

## 2021-01-07 RX ORDER — ALBUTEROL SULFATE 90 UG/1
2 AEROSOL, METERED RESPIRATORY (INHALATION)
Qty: 1 INHALER | Refills: 1 | Status: SHIPPED | OUTPATIENT
Start: 2021-01-07

## 2021-01-07 RX ORDER — CODEINE PHOSPHATE AND GUAIFENESIN 10; 100 MG/5ML; MG/5ML
10 SOLUTION ORAL ONCE
Status: COMPLETED | OUTPATIENT
Start: 2021-01-07 | End: 2021-01-07

## 2021-01-07 RX ORDER — CODEINE PHOSPHATE AND GUAIFENESIN 10; 100 MG/5ML; MG/5ML
5 SOLUTION ORAL
Qty: 160 ML | Refills: 0 | Status: SHIPPED | OUTPATIENT
Start: 2021-01-07 | End: 2021-01-12

## 2021-01-07 RX ORDER — PREDNISONE 20 MG/1
60 TABLET ORAL DAILY
Qty: 12 TAB | Refills: 0 | Status: SHIPPED | OUTPATIENT
Start: 2021-01-07 | End: 2021-01-11

## 2021-01-07 RX ADMIN — MAGNESIUM SULFATE HEPTAHYDRATE 2 G: 40 INJECTION, SOLUTION INTRAVENOUS at 14:33

## 2021-01-07 RX ADMIN — GUAIFENESIN AND CODEINE PHOSPHATE 10 ML: 100; 10 SOLUTION ORAL at 14:31

## 2021-01-07 RX ADMIN — DEXAMETHASONE SODIUM PHOSPHATE 10 MG: 4 INJECTION, SOLUTION INTRAMUSCULAR; INTRAVENOUS at 14:28

## 2021-01-07 NOTE — ED NOTES
Patient received discharge paperwork from RN. Patient verbalized understanding of discharge paper word. Patient ambulatory out of the ER.

## 2021-01-07 NOTE — ED TRIAGE NOTES
Patient report shortness of breath that has been on going since dx with covid on 12/28/2020.  Patient states has had multiple follow up for shortness of breath with no relief

## 2021-01-07 NOTE — ED PROVIDER NOTES
EMERGENCY DEPARTMENT HISTORY AND PHYSICAL EXAM    Date: 1/7/2021  Patient Name: Jose Palmer    History of Presenting Illness     Chief Complaint   Patient presents with    Shortness of Breath         History Provided By: Patient    Chief Complaint: SOB, cough    HPI(Context):   1:43 PM  Jose Palmer is a 41 y.o. female with PMHX of asthma, RA, IBS, Opitz-Sarah, renal stones who presents to the emergency department C/O SOB. Associated sxs include cough and wheezing. Sxs x 11-12 days. Pt had + COVID test on 12/28/2020. Pt reports she feels like her asthma is flaring up. Pt reports chest pressure but no CP. No relief with Tessalon. No hx of intubation or inpatient for asthma. Pt denies fever, chills, chest pain, nausea, vomiting, leg swelling, and any other sxs or complaints. Pt is nonsmoker. PCP: Henna Daley MD    Current Outpatient Medications   Medication Sig Dispense Refill    predniSONE (DELTASONE) 20 mg tablet Take 60 mg by mouth daily for 4 days. Take with food. Indications: worsening asthma 12 Tab 0    guaiFENesin-codeine (ROBITUSSIN AC) 100-10 mg/5 mL solution Take 5 mL by mouth three (3) times daily as needed for Cough for up to 5 days. Max Daily Amount: 15 mL. 160 mL 0    albuterol (PROVENTIL HFA, VENTOLIN HFA, PROAIR HFA) 90 mcg/actuation inhaler Take 2 Puffs by inhalation every four (4) hours as needed for Wheezing or Shortness of Breath. 1 Inhaler 1    inhalational spacing device 1 Each by Does Not Apply route as needed (to be used with albuterol HFA.). Please dispense one adult spacer 1 Device 0    tolterodine ER (DETROL LA) 4 mg ER capsule Take 1 Cap by mouth daily. Indications: Urine Leakage When there is a Strong Desire to Void 90 Cap 3    busPIRone (BUSPAR) 5 mg tablet TAKE 1 TABLET BY MOUTH 3 TIMES DAILY. 5    fluticasone (VERAMYST) 27.5 mcg/actuation nasal spray 55 mcg.       amitriptyline (ELAVIL) 10 mg tablet TAKE 1 TABLET BY MOUTH EVERY NIGHT AT BEDTIME  4    ergocalciferol (ERGOCALCIFEROL) 50,000 unit capsule TAKE ONE CAPSULE BY MOUTH EVERY WEEK  3    norethindrone-e.estradiol-iron (LO LOESTRIN FE) 1 mg-10 mcg (24)/10 mcg (2) tab Take 1 Tab by mouth daily.  celecoxib (CELEBREX) 200 mg capsule Take 200 mg by mouth daily.  cyanocobalamin 1,000 mcg tablet Take 1,000 mcg by mouth daily.  fexofenadine (ALLEGRA) 180 mg tablet Take  by mouth daily. Past History     Past Medical History:  Past Medical History:   Diagnosis Date    Asthma     Blurred vision     Carpal tunnel syndrome     GERD (gastroesophageal reflux disease)     Hearing loss     IBS (irritable bowel syndrome)     Kidney stones     Opitz-Sarah syndrome     Other ill-defined conditions(799.89)     sinus, arthritis, bone disease    [facility]     RA [facility]umatoid arthritis) ([facility] [address]     Urinary frequency     Urinary urgency     Varicose vein        Past Surgical History:  Past Surgical History:   Procedure Laterality Date    HX CARPAL TUNNEL RELEASE      HX CATARACT REMOVAL      HX HERNIA REPAIR      HX UROLOGICAL      Biopsy of urethra       Family History:  No family history on file. Social History:  Social History     Tobacco Use    Smoking status: Never Smoker    Smokeless tobacco: Never Used   Substance Use Topics    Alcohol use: Not Currently     Comment: occ    Drug use: No       Allergies: Allergies   Allergen Reactions    Coconut Rash and Itching     GI distress      Coconut      Other reaction(s): gi distress, mild rash/itching    Coconut Oil Other (comments)     Other reaction(s): gi distress, mild rash/itching    Nuts [Tree Nut] Other (comments)     States found out in allergy testing    Pecan Extract Other (comments)     Other reaction(s): unknown  Positive reaction to allergy skin testing    Pecan Nut Unknown (comments)     Positive reaction to allergy skin testing    Tree Nuts Other (comments)     Other reaction(s):  Other (comments)  States found out in allergy testing         Review of Systems   Review of Systems   Constitutional: Negative for chills and fever. Respiratory: Positive for cough, chest tightness and shortness of breath. Cardiovascular: Negative for chest pain and leg swelling. Musculoskeletal: Negative for myalgias. Allergic/Immunologic: Negative for immunocompromised state. All other systems reviewed and are negative. Physical Exam     Vitals:    01/07/21 1445 01/07/21 1500 01/07/21 1515 01/07/21 1530   BP: 122/65 115/73 116/64 106/69   Pulse: 82 84 75 76   Resp: 22 23 23 23   Temp:       SpO2:   99%    Weight:       Height:         Physical Exam  Vitals signs and nursing note reviewed. Constitutional:       General: She is not in acute distress. Appearance: She is well-developed. She is not diaphoretic. Comments: AA female with bronchospasms. No resp distress. HENT:      Head: Normocephalic and atraumatic. Right Ear: External ear normal.      Left Ear: External ear normal.      Nose: Nose normal. No congestion. Eyes:      General: No scleral icterus. Right eye: No discharge. Left eye: No discharge. Conjunctiva/sclera: Conjunctivae normal.   Neck:      Musculoskeletal: Normal range of motion and neck supple. Cardiovascular:      Rate and Rhythm: Normal rate and regular rhythm. Heart sounds: Normal heart sounds. No murmur. No friction rub. No gallop. Pulmonary:      Effort: Pulmonary effort is normal. No tachypnea, accessory muscle usage or respiratory distress. Breath sounds: Examination of the right-lower field reveals wheezing. Examination of the left-lower field reveals wheezing. Wheezing present. No decreased breath sounds, rhonchi or rales. Comments: active bronchospasms  Musculoskeletal: Normal range of motion. General: No tenderness. Skin:     General: Skin is warm and dry.    Neurological:      Mental Status: She is alert and oriented to person, place, and time. Psychiatric:         Behavior: Behavior normal.         Judgment: Judgment normal.             Diagnostic Study Results     Labs -     Recent Results (from the past 12 hour(s))   EKG, 12 LEAD, INITIAL    Collection Time: 01/07/21  2:16 PM   Result Value Ref Range    Ventricular Rate 84 BPM    Atrial Rate 84 BPM    P-R Interval 162 ms    QRS Duration 78 ms    Q-T Interval 368 ms    QTC Calculation (Bezet) 434 ms    Calculated P Axis 42 degrees    Calculated R Axis 10 degrees    Calculated T Axis 16 degrees    Diagnosis       Normal sinus rhythm with sinus arrhythmia  Normal ECG  When compared with ECG of 28-DEC-2020 10:11,  No significant change was found     CBC WITH AUTOMATED DIFF    Collection Time: 01/07/21  2:35 PM   Result Value Ref Range    WBC 5.5 4.6 - 13.2 K/uL    RBC 4.46 4.20 - 5.30 M/uL    HGB 14.1 12.0 - 16.0 g/dL    HCT 41.6 35.0 - 45.0 %    MCV 93.3 74.0 - 97.0 FL    MCH 31.6 24.0 - 34.0 PG    MCHC 33.9 31.0 - 37.0 g/dL    RDW 12.5 11.6 - 14.5 %    PLATELET 715 (H) 691 - 420 K/uL    MPV 9.2 9.2 - 11.8 FL    NEUTROPHILS 40 40 - 73 %    LYMPHOCYTES 49 21 - 52 %    MONOCYTES 10 3 - 10 %    EOSINOPHILS 1 0 - 5 %    BASOPHILS 0 0 - 2 %    ABS. NEUTROPHILS 2.2 1.8 - 8.0 K/UL    ABS. LYMPHOCYTES 2.7 0.9 - 3.6 K/UL    ABS. MONOCYTES 0.6 0.05 - 1.2 K/UL    ABS. EOSINOPHILS 0.1 0.0 - 0.4 K/UL    ABS.  BASOPHILS 0.0 0.0 - 0.1 K/UL    DF AUTOMATED     METABOLIC PANEL, COMPREHENSIVE    Collection Time: 01/07/21  2:35 PM   Result Value Ref Range    Sodium 137 136 - 145 mmol/L    Potassium 3.4 (L) 3.5 - 5.5 mmol/L    Chloride 101 100 - 111 mmol/L    CO2 28 21 - 32 mmol/L    Anion gap 8 3.0 - 18 mmol/L    Glucose 130 (H) 74 - 99 mg/dL    BUN 10 7.0 - 18 MG/DL    Creatinine 0.73 0.6 - 1.3 MG/DL    BUN/Creatinine ratio 14 12 - 20      GFR est AA >60 >60 ml/min/1.73m2    GFR est non-AA >60 >60 ml/min/1.73m2    Calcium 9.9 8.5 - 10.1 MG/DL    Bilirubin, total 0.3 0.2 - 1.0 MG/DL ALT (SGPT) 32 13 - 56 U/L    AST (SGOT) 25 10 - 38 U/L    Alk. phosphatase 72 45 - 117 U/L    Protein, total 7.9 6.4 - 8.2 g/dL    Albumin 3.5 3.4 - 5.0 g/dL    Globulin 4.4 (H) 2.0 - 4.0 g/dL    A-G Ratio 0.8 0.8 - 1.7     MAGNESIUM    Collection Time: 01/07/21  2:35 PM   Result Value Ref Range    Magnesium 2.0 1.6 - 2.6 mg/dL   CARDIAC PANEL,(CK, CKMB & TROPONIN)    Collection Time: 01/07/21  2:35 PM   Result Value Ref Range    CK - MB <1.0 <3.6 ng/ml    CK-MB Index  0.0 - 4.0 %     CALCULATION NOT PERFORMED WHEN RESULT IS BELOW LINEAR LIMIT    CK 87 26 - 192 U/L    Troponin-I, QT <0.02 0.0 - 0.045 NG/ML   D DIMER    Collection Time: 01/07/21  2:35 PM   Result Value Ref Range    D DIMER <0.27 <0.46 ug/ml(FEU)   URINALYSIS W/ RFLX MICROSCOPIC    Collection Time: 01/07/21  4:05 PM   Result Value Ref Range    Color YELLOW      Appearance CLEAR      Specific gravity 1.010 1.005 - 1.030      pH (UA) 7.0 5.0 - 8.0      Protein Negative NEG mg/dL    Glucose 250 (A) NEG mg/dL    Ketone Negative NEG mg/dL    Bilirubin Negative NEG      Blood Negative NEG      Urobilinogen 1.0 0.2 - 1.0 EU/dL    Nitrites Negative NEG      Leukocyte Esterase Negative NEG             XR CHEST PORT   Final Result   IMPRESSION:      1. Very mild atelectasis or possible early pneumonia medial right base. 2. No other new active disease. CT Results  (Last 48 hours)    None        CXR Results  (Last 48 hours)               01/07/21 1452  XR CHEST PORT Final result    Impression:  IMPRESSION:       1. Very mild atelectasis or possible early pneumonia medial right base. 2. No other new active disease. Narrative:  EXAM: Portable frontal view of the chest.       CLINICAL INDICATION/HISTORY: Shortness of breath, Covid 19 infection       COMPARISON: 12/28/2020       _______________       FINDINGS:        There is normal mediastinal and cardiac silhouette.  Minimal streaky and hazy   density medial right base with remainder of lungs clear. No pleural effusion. _______________                 Medications given in the ED-  Medications   dexamethasone (DECADRON) 4 mg/mL injection 10 mg (10 mg IntraVENous Given 1/7/21 1428)   magnesium sulfate 2 g/50 ml IVPB (premix or compounded) (0 g IntraVENous IV Completed 1/7/21 1533)   guaiFENesin-codeine (ROBITUSSIN AC) 100-10 mg/5 mL solution 10 mL (10 mL Oral Given 1/7/21 1431)         Medical Decision Making   I am the first provider for this patient. I reviewed the vital signs, available nursing notes, past medical history, past surgical history, family history and social history. Vital Signs-Reviewed the patient's vital signs. Pulse Oximetry Analysis - 99% on RA. NORMAL     Records Reviewed: Nursing Notes and Old Medical Records    Provider Notes (Medical Decision Making): asthma/RAD, bronchitis, COVID, PNA, PE, ACS/MI, pericarditis, myocarditis    Procedures:  Procedures    ED Course:   1:43 PM Initial assessment performed. The patients presenting problems have been discussed, and they are in agreement with the care plan formulated and outlined with them. I have encouraged them to ask questions as they arise throughout their visit. Diagnosis and Disposition       3:45 PM  Feels better. Pt very appreciative. Wheezing resolved. Labs reassuring with negative DDimer. Doubt cardiac. Suspect CXR findings related to recent + SARS-COV-2 test. Finish ABX. Albuterol HFA with spacer. Reasons to RTED discussed with pt. All questions answered. Pt feels comfortable going home at this time. Pt expressed understanding and she agrees with plan. 1. Moderate persistent asthma with acute exacerbation    2. Acute bronchospasm    3. COVID-19 virus infection        PLAN:  1. D/C Home  2. Discharge Medication List as of 1/7/2021  3:49 PM      START taking these medications    Details   predniSONE (DELTASONE) 20 mg tablet Take 60 mg by mouth daily for 4 days. Take with food.   Indications: worsening asthma, Normal, Disp-12 Tab, R-0      guaiFENesin-codeine (ROBITUSSIN AC) 100-10 mg/5 mL solution Take 5 mL by mouth three (3) times daily as needed for Cough for up to 5 days. Max Daily Amount: 15 mL., Normal, Disp-160 mL, R-0      inhalational spacing device 1 Each by Does Not Apply route as needed (to be used with albuterol HFA.). Please dispense one adult spacer, Normal, Disp-1 Device, R-0         CONTINUE these medications which have CHANGED    Details   albuterol (PROVENTIL HFA, VENTOLIN HFA, PROAIR HFA) 90 mcg/actuation inhaler Take 2 Puffs by inhalation every four (4) hours as needed for Wheezing or Shortness of Breath., Normal, Disp-1 Inhaler, R-1         CONTINUE these medications which have NOT CHANGED    Details   tolterodine ER (DETROL LA) 4 mg ER capsule Take 1 Cap by mouth daily. Indications: Urine Leakage When there is a Strong Desire to Void, Normal, Disp-90 Cap, R-3      busPIRone (BUSPAR) 5 mg tablet TAKE 1 TABLET BY MOUTH 3 TIMES DAILY. , Historical Med, R-5      fluticasone (VERAMYST) 27.5 mcg/actuation nasal spray 55 mcg., Historical Med      amitriptyline (ELAVIL) 10 mg tablet TAKE 1 TABLET BY MOUTH EVERY NIGHT AT BEDTIME, Historical Med, R-4      ergocalciferol (ERGOCALCIFEROL) 50,000 unit capsule TAKE ONE CAPSULE BY MOUTH EVERY WEEK, Historical Med, R-3      norethindrone-e.estradiol-iron (LO LOESTRIN FE) 1 mg-10 mcg (24)/10 mcg (2) tab Take 1 Tab by mouth daily. , Historical Med      celecoxib (CELEBREX) 200 mg capsule Take 200 mg by mouth daily. , Historical Med      cyanocobalamin 1,000 mcg tablet Take 1,000 mcg by mouth daily. , Historical Med      fexofenadine (ALLEGRA) 180 mg tablet Take  by mouth daily. , Historical Med           3.    Follow-up Information     Follow up With Specialties Details Why [address], [address], [place] [facility]   [address] EMERGENCY DEPT Emergency Medicine   [address] [place] [address]  [phone]        _______________________________    Attestations: This note is prepared by Ravinder Albrecht PA-C.  _______________________________          Please note that this dictation was completed with [employer], the computer voice recognition software. Quite often unanticipated grammatical, syntax, homophones, and other interpretive errors are inadvertently transcribed by the computer software. Please disregard these errors. Please excuse any errors that have escaped final proofreading.

## 2021-01-07 NOTE — LETTER
[facility] [place] 
THE [facility] EMERGENCY DEPT 
[address] [address] [phone] Work/School Note Date: 1/7/2021 To Whom It May concern: Raquel Ramesh was seen and treated today in the emergency room by the following provider(s): 
Attending Provider: Jordy Sorto MD 
Physician Assistant: Willa Chung PA-C. Raquel Ramesh may return to work on 01/08/2021. Sincerely, Sahra Gabriel PA-C

## 2021-01-08 ENCOUNTER — PATIENT OUTREACH (OUTPATIENT)
Dept: CASE MANAGEMENT | Age: 46
End: 2021-01-08

## 2021-01-08 NOTE — PROGRESS NOTES
Patient contacted regarding OULND-29 diagnosis\". Discussed COVID-19 related testing which was patient was tested on 20 - positive at this time. Test results were NA. Patient informed of results, if available? NA     Care Transition Nurse/ Ambulatory Care Manager contacted the patient by telephone to perform post discharge assessment. Call within 2 business days of discharge: Yes Verified name and  with patient as identifiers. Provided introduction to self, and explanation of the CTN/ACM role, and reason for call due to risk factors for infection and/or exposure to COVID-19. Symptoms reviewed with patient who verbalized the following symptoms: cough, shortness of breath and no worsening symptoms      Due to no new or worsening symptoms encounter was not routed to provider for escalation. Discussed follow-up appointments. If no appointment was previously scheduled, appointment scheduling offered:  [facility] follow up appointment(s): No future appointments. Non-[facility] follow up appointment(s): patient waiting to hear back from PCP about appointment     Advance Care Planning:   Does patient have an Advance Directive:  not on file. Patient has following risk factors of: asthma and diabetes. CTN/ACM reviewed discharge instructions, medical action plan and red flags such as increased shortness of breath, increasing fever and signs of decompensation with patient who verbalized understanding. Discussed exposure protocols and quarantine with CDC Guidelines What to do if you are sick with coronavirus disease .  Patient was given an opportunity for questions and concerns. The patient agrees to contact the [facility] exposure line [phone], [facility]  [phone] and PCP office for questions related to their healthcare. CTN/ACM provided contact information for future needs.     Reviewed and educated patient on any new and changed medications related to discharge diagnosis     Patient/family/caregiver given information for GetWell Loop and agrees to enroll yes  Patient's preferred e-mail: NA   Patient's preferred phone number: [phone]  Based on Loop alert triggers, patient will be contacted by nurse care manager for worsening symptoms. Pt will be further monitored by COVID Loop Team based on severity of symptoms and risk factors. States quarantine ended on 1/5/21.

## 2021-01-10 ENCOUNTER — PATIENT OUTREACH (OUTPATIENT)
Dept: CASE MANAGEMENT | Age: 46
End: 2021-01-10

## 2021-01-10 NOTE — PROGRESS NOTES
Yellow alert noted in remote COVID-19 Loop Symptom Monitoring Program. Messaged patient to notify Jillian Brunner if symptoms have worsened since yesterday.

## 2021-02-24 ENCOUNTER — HOSPITAL ENCOUNTER (OUTPATIENT)
Dept: NON INVASIVE DIAGNOSTICS | Age: 46
Discharge: HOME | End: 2021-02-24
Payer: MEDICAID

## 2021-02-24 ENCOUNTER — HOSPITAL ENCOUNTER (OUTPATIENT)
Dept: LAB | Age: 46
Discharge: HOME | End: 2021-02-24
Payer: MEDICAID

## 2021-02-24 ENCOUNTER — TRANSCRIBE ORDER (OUTPATIENT)
Dept: REGISTRATION | Age: 46
End: 2021-02-24

## 2021-02-24 DIAGNOSIS — M65.4 DE QUERVAIN'S TENOSYNOVITIS, LEFT: ICD-10-CM

## 2021-02-24 DIAGNOSIS — Z01.812 BLOOD TESTS PRIOR TO TREATMENT OR PROCEDURE: ICD-10-CM

## 2021-02-24 DIAGNOSIS — G47.30 APNEA, SLEEP: ICD-10-CM

## 2021-02-24 DIAGNOSIS — E11.9: ICD-10-CM

## 2021-02-24 DIAGNOSIS — Z01.812 BLOOD TESTS PRIOR TO TREATMENT OR PROCEDURE: Primary | ICD-10-CM

## 2021-02-24 LAB
ALBUMIN SERPL-MCNC: 3.6 G/DL (ref 3.4–5)
ALBUMIN/GLOB SERPL: 0.9 {RATIO} (ref 0.8–1.7)
ALP SERPL-CCNC: 69 U/L (ref 45–117)
ALT SERPL-CCNC: 26 U/L (ref 13–56)
ANION GAP SERPL CALC-SCNC: 6 MMOL/L (ref 3–18)
AST SERPL-CCNC: 15 U/L (ref 10–38)
BASOPHILS # BLD: 0 K/UL (ref 0–0.1)
BASOPHILS NFR BLD: 0 % (ref 0–2)
BILIRUB SERPL-MCNC: 0.5 MG/DL (ref 0.2–1)
BUN SERPL-MCNC: 14 MG/DL (ref 7–18)
BUN/CREAT SERPL: 23 (ref 12–20)
CALCIUM SERPL-MCNC: 9.8 MG/DL (ref 8.5–10.1)
CHLORIDE SERPL-SCNC: 100 MMOL/L (ref 100–111)
CO2 SERPL-SCNC: 29 MMOL/L (ref 21–32)
CREAT SERPL-MCNC: 0.62 MG/DL (ref 0.6–1.3)
DIFFERENTIAL METHOD BLD: ABNORMAL
EOSINOPHIL # BLD: 0.1 K/UL (ref 0–0.4)
EOSINOPHIL NFR BLD: 2 % (ref 0–5)
ERYTHROCYTE [DISTWIDTH] IN BLOOD BY AUTOMATED COUNT: 14.4 % (ref 11.6–14.5)
GLOBULIN SER CALC-MCNC: 3.8 G/DL (ref 2–4)
GLUCOSE SERPL-MCNC: 92 MG/DL (ref 74–99)
HCT VFR BLD AUTO: 41 % (ref 35–45)
HGB BLD-MCNC: 13.6 G/DL (ref 12–16)
LYMPHOCYTES # BLD: 3.5 K/UL (ref 0.9–3.6)
LYMPHOCYTES NFR BLD: 40 % (ref 21–52)
MCH RBC QN AUTO: 31.6 PG (ref 24–34)
MCHC RBC AUTO-ENTMCNC: 33.2 G/DL (ref 31–37)
MCV RBC AUTO: 95.1 FL (ref 74–97)
MONOCYTES # BLD: 0.7 K/UL (ref 0.05–1.2)
MONOCYTES NFR BLD: 8 % (ref 3–10)
NEUTS SEG # BLD: 4.4 K/UL (ref 1.8–8)
NEUTS SEG NFR BLD: 50 % (ref 40–73)
PLATELET # BLD AUTO: 310 K/UL (ref 135–420)
PMV BLD AUTO: 9.1 FL (ref 9.2–11.8)
POTASSIUM SERPL-SCNC: 3.6 MMOL/L (ref 3.5–5.5)
PROT SERPL-MCNC: 7.4 G/DL (ref 6.4–8.2)
RBC # BLD AUTO: 4.31 M/UL (ref 4.2–5.3)
SODIUM SERPL-SCNC: 135 MMOL/L (ref 136–145)
WBC # BLD AUTO: 8.8 K/UL (ref 4.6–13.2)

## 2021-02-24 PROCEDURE — 80053 COMPREHEN METABOLIC PANEL: CPT

## 2021-02-24 PROCEDURE — 36415 COLL VENOUS BLD VENIPUNCTURE: CPT

## 2021-02-24 PROCEDURE — 85025 COMPLETE CBC W/AUTO DIFF WBC: CPT

## 2021-10-10 ENCOUNTER — HOSPITAL ENCOUNTER (EMERGENCY)
Age: 46
Discharge: HOME | End: 2021-10-10
Attending: STUDENT IN AN ORGANIZED HEALTH CARE EDUCATION/TRAINING PROGRAM
Payer: MEDICAID

## 2021-10-10 ENCOUNTER — APPOINTMENT (OUTPATIENT)
Dept: GENERAL RADIOLOGY | Age: 46
End: 2021-10-10
Attending: STUDENT IN AN ORGANIZED HEALTH CARE EDUCATION/TRAINING PROGRAM
Payer: MEDICAID

## 2021-10-10 VITALS
WEIGHT: 154 LBS | RESPIRATION RATE: 18 BRPM | SYSTOLIC BLOOD PRESSURE: 146 MMHG | HEIGHT: 59 IN | HEART RATE: 83 BPM | BODY MASS INDEX: 31.04 KG/M2 | DIASTOLIC BLOOD PRESSURE: 99 MMHG | TEMPERATURE: 97.3 F | OXYGEN SATURATION: 100 %

## 2021-10-10 DIAGNOSIS — R05.3 CHRONIC COUGH: Primary | ICD-10-CM

## 2021-10-10 PROCEDURE — 99282 EMERGENCY DEPT VISIT SF MDM: CPT

## 2021-10-10 PROCEDURE — 71046 X-RAY EXAM CHEST 2 VIEWS: CPT

## 2021-10-10 RX ORDER — GUAIFENESIN/DEXTROMETHORPHAN 100-10MG/5
5 SYRUP ORAL
Qty: 50 ML | Refills: 0 | Status: SHIPPED | OUTPATIENT
Start: 2021-10-10 | End: 2021-10-20

## 2021-10-10 NOTE — DISCHARGE INSTRUCTIONS
Please call your pulmonary doctor to see if you can move your appointment up. Continue all your asthma medications as prescribed. Use codeine-guanfacine to manage her cough, this contains a narcotic medication. If you need to work, drive a car or operate machinery use regular Robitussin which I also sent to the pharmacy for you. Return the emergency department as needed.

## 2021-10-10 NOTE — Clinical Note
[facility] [place]  THE [facility] EMERGENCY DEPT  [address] [facility] [address] [address] [phone]    Work/School Note    Date: 10/10/2021    To Whom It May concern:      Jaison Javier was seen and treated today in the emergency room by the following provider(s):  Attending Provider: Alfredo Mcardle, MD.      Jaison Javier is excused from work/school on 10/10/21. She is clear to return to work/school on 10/11/21.         Sincerely,          Keaton Valverde MD

## 2021-10-10 NOTE — ED PROVIDER NOTES
EMERGENCY DEPARTMENT HISTORY AND PHYSICAL EXAM    11:06 AM    Date: 10/10/2021  Patient Name: Mela Simon    History of Presenting Illness     Chief Complaint   Patient presents with    Cough       History Provided By: Patient  Location/Duration/Severity/Modifying factors   HPI   Mela Simon is a 40 y.o. female with past medical history of asthma, Liane Shorten syndrome, followed by pulmonology presenting for evaluation of cough. She says that for the last 3 months she has had a chronic cough that is intermittently productive of white sputum. For the last couple of days she has been using her albuterol every 6 hours or so with temporary relief of her symptoms. She denies feeling any fevers or having significant exertional dyspnea or chest tightness. She tested positive for Covid earlier this year, but has since been vaccinated with Thotz. No known sick contacts. She has seen her pulmonologist for this as well as had multiple ER visits and has tried most prescription antitussives. She says she is compliant with her Advair inhaler. Denies tobacco use. No other medical complaints. PCP: Mathieu Pelaez MD    Current Outpatient Medications   Medication Sig Dispense Refill    guaiFENesin-dextromethorphan (ROBITUSSIN DM) 100-10 mg/5 mL syrup Take 5 mL by mouth three (3) times daily as needed for Cough or Congestion for up to 10 days. 50 mL 0    Codeine-guaiFENesin 6.3-100 mg/5 mL liqd Take 5 mL by mouth every six to eight (6-8) hours as needed for Cough for up to 3 days. Max Daily Amount: 20 mL. 1 Each 0    albuterol (PROVENTIL HFA, VENTOLIN HFA, PROAIR HFA) 90 mcg/actuation inhaler Take 2 Puffs by inhalation every four (4) hours as needed for Wheezing or Shortness of Breath. 1 Inhaler 1    inhalational spacing device 1 Each by Does Not Apply route as needed (to be used with albuterol HFA.).  Please dispense one adult spacer 1 Device 0    tolterodine ER (DETROL LA) 4 mg ER capsule Take 1 Cap by mouth daily. Indications: Urine Leakage When there is a Strong Desire to Void 90 Cap 3    busPIRone (BUSPAR) 5 mg tablet TAKE 1 TABLET BY MOUTH 3 TIMES DAILY. 5    fluticasone (VERAMYST) 27.5 mcg/actuation nasal spray 55 mcg.  amitriptyline (ELAVIL) 10 mg tablet TAKE 1 TABLET BY MOUTH EVERY NIGHT AT BEDTIME  4    ergocalciferol (ERGOCALCIFEROL) 50,000 unit capsule TAKE ONE CAPSULE BY MOUTH EVERY WEEK  3    norethindrone-e.estradiol-iron (LO LOESTRIN FE) 1 mg-10 mcg (24)/10 mcg (2) tab Take 1 Tab by mouth daily.  celecoxib (CELEBREX) 200 mg capsule Take 200 mg by mouth daily.  cyanocobalamin 1,000 mcg tablet Take 1,000 mcg by mouth daily.  fexofenadine (ALLEGRA) 180 mg tablet Take  by mouth daily. Past History     Past Medical History:  Past Medical History:   Diagnosis Date    Asthma     Blurred vision     Carpal tunnel syndrome     GERD (gastroesophageal reflux disease)     Hearing loss     IBS (irritable bowel syndrome)     Kidney stones     Opitz-Sarah syndrome     Other ill-defined conditions(799.89)     sinus, arthritis, bone disease    PAD (peripheral artery disease) (HCC)     RA (rheumatoid arthritis) ([facility] [address] [address]     Urinary frequency     Urinary urgency     Varicose vein        Past Surgical History:  Past Surgical History:   Procedure Laterality Date    HX CARPAL TUNNEL RELEASE      HX CATARACT REMOVAL      HX HERNIA REPAIR      HX UROLOGICAL      Biopsy of urethra       Family History:  History reviewed. No pertinent family history. Social History:  Social History     Tobacco Use    Smoking status: Never Smoker    Smokeless tobacco: Never Used   Vaping Use    Vaping Use: Never used   Substance Use Topics    Alcohol use: Not Currently     Comment: occ    Drug use: No       Allergies:   Allergies   Allergen Reactions    Coconut Rash and Itching     GI distress      Coconut      Other reaction(s): gi distress, mild rash/itching    Coconut Oil Other (comments) Other reaction(s): gi distress, mild rash/itching    Nuts [Tree Nut] Other (comments)     States found out in allergy testing    Pecan Extract Other (comments)     Other reaction(s): unknown  Positive reaction to allergy skin testing    Pecan Nut Unknown (comments)     Positive reaction to allergy skin testing    Tree Nuts Other (comments)     Other reaction(s): Other (comments)  States found out in allergy testing       I reviewed and confirmed the above information with patient and updated as necessary. Review of Systems     Review of Systems   Constitutional: Negative for diaphoresis and fever. HENT: Negative for ear pain and sore throat. Eyes:        No acute change in vision   Respiratory: Positive for cough and wheezing. Negative for shortness of breath. Cardiovascular: Negative for chest pain and leg swelling. Gastrointestinal: Negative for abdominal pain and vomiting. Genitourinary: Negative for dysuria. Musculoskeletal: Negative for neck pain. Skin: Negative for wound. Neurological: Negative for weakness and headaches. Physical Exam     Visit Vitals  BP (!) 146/99   Pulse 83   Temp 97.3 °F (36.3 °C)   Resp 18   Ht 4' 11\" (1.499 m)   Wt 69.9 kg (154 lb)   SpO2 100%   BMI 31.10 kg/m²       Physical Exam  Vitals and nursing note reviewed. Constitutional:       Comments: No female resting comfortably, no respiratory distress. HENT:      Mouth/Throat:      Mouth: Mucous membranes are moist.   Eyes:      Pupils: Pupils are equal, round, and reactive to light. Cardiovascular:      Rate and Rhythm: Normal rate and regular rhythm. Pulses: Normal pulses. Pulmonary:      Effort: Pulmonary effort is normal. No respiratory distress. Breath sounds: Normal breath sounds. No stridor. No wheezing, rhonchi or rales. Comments: Coughing occasionally  Abdominal:      Palpations: Abdomen is soft. Tenderness: There is no abdominal tenderness.    Musculoskeletal: General: No signs of injury. Normal range of motion. Cervical back: Normal range of motion. Skin:     General: Skin is warm. Neurological:      General: No focal deficit present. Mental Status: She is alert and oriented to person, place, and time. Mental status is at baseline. Diagnostic Study Results     Labs -  Recent Results (from the past 24 hour(s))   EKG, 12 LEAD, INITIAL    Collection Time: 10/10/21 10:36 AM   Result Value Ref Range    Ventricular Rate 104 BPM    Atrial Rate 104 BPM    P-R Interval 172 ms    QRS Duration 76 ms    Q-T Interval 328 ms    QTC Calculation (Bezet) 431 ms    Calculated P Axis 78 degrees    Calculated R Axis 83 degrees    Calculated T Axis -89 degrees    Diagnosis       Undetermined rhythm  Low voltage QRS  ST & T wave abnormality, consider inferior ischemia  Abnormal ECG  When compared with ECG of 07-JAN-2021 14:16,  Significant changes have occurred           Radiologic Studies -   XR CHEST PA LAT   Final Result      No active cardiopulmonary disease. Medical Decision Making   I am the first provider for this patient. I reviewed the vital signs, available nursing notes, past medical history, past surgical history, family history and social history. Vital Signs-Reviewed the patient's vital signs. EKG: Nondiagnostic    Records Reviewed: Nursing Notes, Old Medical Records, Previous electrocardiograms, Previous Radiology Studies and Previous Laboratory Studies (Time of Review: 11:06 AM)    Provider Notes (Medical Decision Making):   MDM  50-year-old female here for chronic cough, suspect very likely cough variant asthma, also consider complication due to her underlying lupus free syndrome which can cause defects of the lower area and trachea as well as breathing issues. Consider also pneumonia, pneumothorax. I have reviewed recent work-up, low suspicion for pulmonary embolism.     ED Course: Progress Notes, Reevaluation, and Consults:  Patient arrives to the ED afebrile, hemodynamically normal satting 100% on room air  She is occasionally coughing but her lungs are clear to auscultation  Rest of her exam is unremarkable    Given that she has no specific pulmonary emboli or cardiac risk factors with recent reassuring work-ups do not feel that this is necessary to obtain blood work or D-dimer. Patient is PERC negative    Will obtain x-ray, EKG. Recently did a breathing treatment, no treatment indicated at this time    Chest x-ray shows no acute process, EKG nondiagnostic. Patient appropriate for outpatient management with her PCP and pulmonologist.  We will send for codeine-based cough medicine. We discussed times in which he does prefer to use this and when it is not. She will use otherwise Robitussin DM. Discharged home in stable condition, without hypoxia. Diagnosis     Clinical Impression:   1. Chronic cough        Disposition: Home    Follow-up Information     Follow up With Specialties Details Why [address]    THE [facility] EMERGENCY DEPT Emergency Medicine  As needed, If symptoms worsen [address]  [phone]    Taylor Baires MD Family Medicine Schedule an appointment as soon as possible for a visit   [address]  [phone]             Discharge Medication List as of 10/10/2021 12:01 PM      START taking these medications    Details   guaiFENesin-dextromethorphan (ROBITUSSIN DM) 100-10 mg/5 mL syrup Take 5 mL by mouth three (3) times daily as needed for Cough or Congestion for up to 10 days. , Normal, Disp-50 mL, R-0      Codeine-guaiFENesin 6.3-100 mg/5 mL liqd Take 5 mL by mouth every six to eight (6-8) hours as needed for Cough for up to 3 days.  Max Daily Amount: 20 mL., Normal, Disp-1 Each, R-0         CONTINUE these medications which have NOT CHANGED    Details   albuterol (PROVENTIL HFA, VENTOLIN HFA, PROAIR HFA) 90 mcg/actuation inhaler Take 2 Puffs by inhalation every four (4) hours as needed for Wheezing or Shortness of Breath., Normal, Disp-1 Inhaler, R-1      inhalational spacing device 1 Each by Does Not Apply route as needed (to be used with albuterol HFA.). Please dispense one adult spacer, Normal, Disp-1 Device, R-0      tolterodine ER (DETROL LA) 4 mg ER capsule Take 1 Cap by mouth daily. Indications: Urine Leakage When there is a Strong Desire to Void, Normal, Disp-90 Cap, R-3      busPIRone (BUSPAR) 5 mg tablet TAKE 1 TABLET BY MOUTH 3 TIMES DAILY. , Historical Med, R-5      fluticasone (VERAMYST) 27.5 mcg/actuation nasal spray 55 mcg., Historical Med      amitriptyline (ELAVIL) 10 mg tablet TAKE 1 TABLET BY MOUTH EVERY NIGHT AT BEDTIME, Historical Med, R-4      ergocalciferol (ERGOCALCIFEROL) 50,000 unit capsule TAKE ONE CAPSULE BY MOUTH EVERY WEEK, Historical Med, R-3      norethindrone-e.estradiol-iron (LO LOESTRIN FE) 1 mg-10 mcg (24)/10 mcg (2) tab Take 1 Tab by mouth daily. , Historical Med      celecoxib (CELEBREX) 200 mg capsule Take 200 mg by mouth daily. , Historical Med      cyanocobalamin 1,000 mcg tablet Take 1,000 mcg by mouth daily. , Historical Med      fexofenadine (ALLEGRA) 180 mg tablet Take  by mouth daily. , Lona Mcgraw MD   Emergency Medicine   October 10, 2021, 11:06 AM     This note is dictated utilizing Dragon voice recognition software. Unfortunately this leads to occasional typographical errors using the voice recognition. I apologize in advance if the situation occurs. If questions occur please do not hesitate to contact me directly.     Kacie Victor MD

## 2021-11-11 ENCOUNTER — HOSPITAL ENCOUNTER (EMERGENCY)
Age: 46
Discharge: HOME | End: 2021-11-11
Attending: EMERGENCY MEDICINE
Payer: MEDICAID

## 2021-11-11 VITALS
OXYGEN SATURATION: 99 % | WEIGHT: 155 LBS | RESPIRATION RATE: 14 BRPM | HEART RATE: 102 BPM | BODY MASS INDEX: 31.25 KG/M2 | HEIGHT: 59 IN | DIASTOLIC BLOOD PRESSURE: 82 MMHG | SYSTOLIC BLOOD PRESSURE: 144 MMHG | TEMPERATURE: 96.9 F

## 2021-11-11 DIAGNOSIS — R19.7 DIARRHEA, UNSPECIFIED TYPE: Primary | ICD-10-CM

## 2021-11-11 LAB
ANION GAP SERPL CALC-SCNC: 7 MMOL/L (ref 3–18)
BASOPHILS # BLD: 0 K/UL (ref 0–0.1)
BASOPHILS NFR BLD: 1 % (ref 0–2)
BUN SERPL-MCNC: 11 MG/DL (ref 7–18)
BUN/CREAT SERPL: 11 (ref 12–20)
CALCIUM SERPL-MCNC: 9.1 MG/DL (ref 8.5–10.1)
CHLORIDE SERPL-SCNC: 102 MMOL/L (ref 100–111)
CO2 SERPL-SCNC: 27 MMOL/L (ref 21–32)
CREAT SERPL-MCNC: 0.98 MG/DL (ref 0.6–1.3)
DIFFERENTIAL METHOD BLD: ABNORMAL
EOSINOPHIL # BLD: 0.1 K/UL (ref 0–0.4)
EOSINOPHIL NFR BLD: 2 % (ref 0–5)
ERYTHROCYTE [DISTWIDTH] IN BLOOD BY AUTOMATED COUNT: 13 % (ref 11.6–14.5)
GLUCOSE SERPL-MCNC: 124 MG/DL (ref 74–99)
HCT VFR BLD AUTO: 44.1 % (ref 35–45)
HGB BLD-MCNC: 14.6 G/DL (ref 12–16)
IMM GRANULOCYTES # BLD AUTO: 0 K/UL (ref 0–0.04)
IMM GRANULOCYTES NFR BLD AUTO: 0 % (ref 0–0.5)
LYMPHOCYTES # BLD: 3.6 K/UL (ref 0.9–3.6)
LYMPHOCYTES NFR BLD: 45 % (ref 21–52)
MCH RBC QN AUTO: 31.3 PG (ref 24–34)
MCHC RBC AUTO-ENTMCNC: 33.1 G/DL (ref 31–37)
MCV RBC AUTO: 94.4 FL (ref 78–100)
MONOCYTES # BLD: 0.5 K/UL (ref 0.05–1.2)
MONOCYTES NFR BLD: 6 % (ref 3–10)
NEUTS SEG # BLD: 3.7 K/UL (ref 1.8–8)
NEUTS SEG NFR BLD: 47 % (ref 40–73)
NRBC # BLD: 0 K/UL (ref 0–0.01)
NRBC BLD-RTO: 0 PER 100 WBC
PLATELET # BLD AUTO: 297 K/UL (ref 135–420)
PMV BLD AUTO: 9 FL (ref 9.2–11.8)
POTASSIUM SERPL-SCNC: 2.9 MMOL/L (ref 3.5–5.5)
RBC # BLD AUTO: 4.67 M/UL (ref 4.2–5.3)
SODIUM SERPL-SCNC: 136 MMOL/L (ref 136–145)
WBC # BLD AUTO: 7.9 K/UL (ref 4.6–13.2)

## 2021-11-11 PROCEDURE — 99282 EMERGENCY DEPT VISIT SF MDM: CPT

## 2021-11-11 PROCEDURE — 80048 BASIC METABOLIC PNL TOTAL CA: CPT

## 2021-11-11 PROCEDURE — 85025 COMPLETE CBC W/AUTO DIFF WBC: CPT

## 2021-11-11 RX ORDER — POTASSIUM CHLORIDE 750 MG/1
20 CAPSULE, EXTENDED RELEASE ORAL 2 TIMES DAILY
Qty: 28 CAPSULE | Refills: 0 | Status: SHIPPED | OUTPATIENT
Start: 2021-11-11 | End: 2021-11-18

## 2021-11-11 NOTE — ED PROVIDER NOTES
EMERGENCY DEPARTMENT HISTORY AND PHYSICAL EXAM    Date: 11/11/2021  Patient Name: Jennifer Lock    History of Presenting Illness     Chief Complaint   Patient presents with    Diarrhea         History Provided By: Patient    Jennifer Lock is a 71-year-old female with past medical history of GERD, IBS, rheumatoid arthritis presents for evaluation of intermittent diarrhea and constipation. Patient states that she has had looser stools sometimes over the last month. She states that she has been eating lettuce that reportedly there was a recall for concern for contamination with Listeria. Patient had called the pharmacist today who told her that she may have an infection. Patient has not had any diarrhea within the last 24 hours, she is not had any fever, abdominal pain, melena, hematochezia. She is followed by GI at [facility] but has not seen him in quite a while. Patient has not had any recent antibiotics or travel. PCP: Adam Ramirez MD    Current Outpatient Medications   Medication Sig Dispense Refill    albuterol (PROVENTIL HFA, VENTOLIN HFA, PROAIR HFA) 90 mcg/actuation inhaler Take 2 Puffs by inhalation every four (4) hours as needed for Wheezing or Shortness of Breath. 1 Inhaler 1    inhalational spacing device 1 Each by Does Not Apply route as needed (to be used with albuterol HFA.). Please dispense one adult spacer 1 Device 0    tolterodine ER (DETROL LA) 4 mg ER capsule Take 1 Cap by mouth daily. Indications: Urine Leakage When there is a Strong Desire to Void 90 Cap 3    busPIRone (BUSPAR) 5 mg tablet TAKE 1 TABLET BY MOUTH 3 TIMES DAILY. 5    fluticasone (VERAMYST) 27.5 mcg/actuation nasal spray 55 mcg.       amitriptyline (ELAVIL) 10 mg tablet TAKE 1 TABLET BY MOUTH EVERY NIGHT AT BEDTIME  4    ergocalciferol (ERGOCALCIFEROL) 50,000 unit capsule TAKE ONE CAPSULE BY MOUTH EVERY WEEK  3    norethindrone-e.estradiol-iron (LO LOESTRIN FE) 1 mg-10 mcg (24)/10 mcg (2) tab Take 1 Tab by mouth daily.  celecoxib (CELEBREX) 200 mg capsule Take 200 mg by mouth daily.  cyanocobalamin 1,000 mcg tablet Take 1,000 mcg by mouth daily.  fexofenadine (ALLEGRA) 180 mg tablet Take  by mouth daily. Past History     Past Medical History:  Past Medical History:   Diagnosis Date    Asthma     Blurred vision     Carpal tunnel syndrome     GERD (gastroesophageal reflux disease)     Hearing loss     IBS (irritable bowel syndrome)     Kidney stones     Opitz-Sarah syndrome     Other ill-defined conditions(799.89)     sinus, arthritis, bone disease    [facility]     RA (rheumatoid arthritis) ([facility]     Urinary frequency     Urinary urgency     Varicose vein        Past Surgical History:  Past Surgical History:   Procedure Laterality Date    HX CARPAL TUNNEL RELEASE      HX CATARACT REMOVAL      HX HERNIA REPAIR      HX UROLOGICAL      Biopsy of urethra       Family History:  No family history on file. Social History:  Social History     Tobacco Use    Smoking status: Never Smoker    Smokeless tobacco: Never Used   Vaping Use    Vaping Use: Never used   Substance Use Topics    Alcohol use: Not Currently     Comment: occ    Drug use: No       Allergies: Allergies   Allergen Reactions    Coconut Rash and Itching     GI distress      Coconut      Other reaction(s): gi distress, mild rash/itching    Coconut Oil Other (comments)     Other reaction(s): gi distress, mild rash/itching    Nuts [Tree Nut] Other (comments)     States found out in allergy testing    Pecan Extract Other (comments)     Other reaction(s): unknown  Positive reaction to allergy skin testing    Pecan Nut Unknown (comments)     Positive reaction to allergy skin testing    Tree Nuts Other (comments)     Other reaction(s):  Other (comments)  States found out in allergy testing         Review of Systems   Review of Systems   Constitutional: Negative for activity change and fever.   HENT: Negative for congestion and sore throat. Eyes: Negative for discharge. Respiratory: Negative for apnea. Cardiovascular: Negative for chest pain. Gastrointestinal: Positive for constipation and diarrhea. Negative for abdominal distention. Genitourinary: Negative for dysuria and flank pain. Musculoskeletal: Negative for arthralgias. Skin: Negative for rash. Neurological: Negative for dizziness and weakness. Hematological: Negative for adenopathy. Psychiatric/Behavioral: Negative for agitation. All other systems reviewed and are negative.       Physical Exam     Vitals:    11/11/21 1315 11/11/21 1334   BP: (!) 144/82    Pulse: (!) 102    Resp: 14    Temp: 96.9 °F (36.1 °C)    SpO2: 99% 99%   Weight: 70.3 kg (155 lb)    Height: 4' 11\" (1.499 m)      Physical Exam    Nursing notes and vital signs reviewed    Constitutional: Non toxic appearing, no acute distress  Head: Normocephalic, Atraumatic  Eyes: EOMI  Neck: Supple  Cardiovascular: Regular rate and rhythm, no murmurs, rubs, or gallops  Chest: Normal work of breathing and chest excursion bilaterally  Lungs: Clear to ausculation bilaterally  Abdomen: Soft, non tender, non distended, normoactive bowel sounds  Back: No evidence of trauma or deformity  Extremities: No evidence of trauma or deformity, no LE edema  Skin: Warm and dry, normal cap refill  Neuro: Alert and appropriate, CN intact, normal speech, strength and sensation full and symmetric bilaterally, normal gait, normal coordination  Psychiatric: Normal mood and affect      Diagnostic Study Results     Labs -     Recent Results (from the past 12 hour(s))   CBC WITH AUTOMATED DIFF    Collection Time: 11/11/21  1:37 PM   Result Value Ref Range    WBC 7.9 4.6 - 13.2 K/uL    RBC 4.67 4.20 - 5.30 M/uL    HGB 14.6 12.0 - 16.0 g/dL    HCT 44.1 35.0 - 45.0 %    MCV 94.4 78.0 - 100.0 FL    MCH 31.3 24.0 - 34.0 PG    MCHC 33.1 31.0 - 37.0 g/dL    RDW 13.0 11.6 - 14.5 %    PLATELET 297 135 - 420 K/uL    MPV 9.0 (L) 9.2 - 11.8 FL    NRBC 0.0 0  WBC    ABSOLUTE NRBC 0.00 0.00 - 0.01 K/uL    NEUTROPHILS 47 40 - 73 %    LYMPHOCYTES 45 21 - 52 %    MONOCYTES 6 3 - 10 %    EOSINOPHILS 2 0 - 5 %    BASOPHILS 1 0 - 2 %    IMMATURE GRANULOCYTES 0 0.0 - 0.5 %    ABS. NEUTROPHILS 3.7 1.8 - 8.0 K/UL    ABS. LYMPHOCYTES 3.6 0.9 - 3.6 K/UL    ABS. MONOCYTES 0.5 0.05 - 1.2 K/UL    ABS. EOSINOPHILS 0.1 0.0 - 0.4 K/UL    ABS. BASOPHILS 0.0 0.0 - 0.1 K/UL    ABS. IMM. GRANS. 0.0 0.00 - 0.04 K/UL    DF AUTOMATED         Radiologic Studies -   No orders to display     CT Results  (Last 48 hours)    None        CXR Results  (Last 48 hours)    None          Medications given in the ED-  Medications - No data to display      Medical Decision Making   I am the first provider for this patient. I reviewed the vital signs, available nursing notes, past medical history, past surgical history, family history and social history. Vital Signs-Reviewed the patient's vital signs. Pulse Oximetry Analysis - 99% on room air, not hypoxic     Records Reviewed: Old Medical Records    Provider Notes (Medical Decision Making): Dieter Brink is a 27-year-old female presents for evaluation of reported episodes of diarrhea and constipation. Patient is concerned for possible Listeria infection. On arrival patient is afebrile, nontoxic-appearing and hemodynamically stable. Abdomen is soft, nontender with no rebound or guarding. Attempted to send a stool sample, however patient was only able to produce a very small amount of formed stool with no evidence of blood, mucus, diarrhea. Given this I have low suspicion for bacterial diarrhea and suspect that patient symptoms are more likely secondary to patient's history of IBS. She will be discharged with follow-up with her GI doctor, advised that she may have an outpatient stool study done if she begins having more diarrhea.   Patient discharged in stable condition. Procedures:  Procedures    ED Course:     Diagnosis and Disposition       DISCHARGE NOTE:    Steven King's  results have been reviewed with her. She has been counseled regarding her diagnosis, treatment, and plan. She verbally conveys understanding and agreement of the signs, symptoms, diagnosis, treatment and prognosis and additionally agrees to follow up as discussed. She also agrees with the care-plan and conveys that all of her questions have been answered. I have also provided discharge instructions for her that include: educational information regarding their diagnosis and treatment, and list of reasons why they would want to return to the ED prior to their follow-up appointment, should her condition change. She has been provided with education for proper emergency department utilization. CLINICAL IMPRESSION:    1. Diarrhea, unspecified type        PLAN:  1. D/C Home  2. Current Discharge Medication List        3. Follow-up Information     Follow up With Specialties Details Why [address], [address], [place] [facility]   [address] [facility] [address] [place] [address] EMERGENCY DEPT Emergency Medicine  As needed, If symptoms worsen [address]  [phone]    Jon Vazquez MD Gastroenterology   [address] [place] [address] [phone]          _______________________________      Please note that this dictation was completed with [employer], the computer voice recognition software. Quite often unanticipated grammatical, syntax, homophones, and other interpretive errors are inadvertently transcribed by the computer software. Please disregard these errors. Please excuse any errors that have escaped final proofreading.

## 2021-11-11 NOTE — ED NOTES
Following patient leaving, patient's potassium returned at 2.9. Attempted to call patient, though she did not answer and her voice mailbox was full. 20 mEq potassium chloride twice daily for 7 days has been sent to patient's pharmacy, should patient call back she should follow-up with her primary care provider for repeat BMP in 1 week.

## 2021-11-11 NOTE — ED TRIAGE NOTES
Pt in for c/o diarrhea since October and was at the pharmacy and told her receipt for lettuce generated a recall and believes diarrhea may be related to listeria.

## 2022-05-04 ENCOUNTER — HOSPITAL ENCOUNTER (EMERGENCY)
Age: 47
Discharge: HOME | End: 2022-05-04
Attending: STUDENT IN AN ORGANIZED HEALTH CARE EDUCATION/TRAINING PROGRAM
Payer: MEDICAID

## 2022-05-04 VITALS
SYSTOLIC BLOOD PRESSURE: 136 MMHG | OXYGEN SATURATION: 100 % | HEART RATE: 95 BPM | TEMPERATURE: 96.9 F | BODY MASS INDEX: 32.66 KG/M2 | DIASTOLIC BLOOD PRESSURE: 80 MMHG | WEIGHT: 162 LBS | RESPIRATION RATE: 18 BRPM | HEIGHT: 59 IN

## 2022-05-04 DIAGNOSIS — T81.31XA POSTOPERATIVE WOUND DEHISCENCE, INITIAL ENCOUNTER: Primary | ICD-10-CM

## 2022-05-04 PROCEDURE — 99283 EMERGENCY DEPT VISIT LOW MDM: CPT

## 2022-05-04 PROCEDURE — 74011000250 HC RX REV CODE- 250

## 2022-05-04 PROCEDURE — 74011000250 HC RX REV CODE- 250: Performed by: STUDENT IN AN ORGANIZED HEALTH CARE EDUCATION/TRAINING PROGRAM

## 2022-05-04 RX ORDER — BACITRACIN 500 [USP'U]/G
OINTMENT TOPICAL
Status: COMPLETED | OUTPATIENT
Start: 2022-05-04 | End: 2022-05-04

## 2022-05-04 RX ADMIN — BACITRACIN 500 G: 500 OINTMENT TOPICAL at 10:53

## 2022-05-04 NOTE — ED NOTES
Pt arrived ambulatory to triage, states she had left breast decreased   Initial surgery 4/22 right breast mastectomy with implant placed, drained left breast incision on Thursday   Pt states site is bleeding

## 2022-05-04 NOTE — ED PROVIDER NOTES
EMERGENCY DEPARTMENT HISTORY AND PHYSICAL EXAM    Date: 5/4/2022  Patient Name: Brittney Shaikh    History of Presenting Illness     Chief Complaint   Patient presents with    Post-Op Problem       History Provided By: Patient     History Amie Emirgreg): Brittney Shaikh is a 40 y.o. female with PMHX of asthma, breast mastectomy with implantation on April 22, had fluid drained by plastic surgeon last week, presents to the ER for wound concerns. Patient states that she noticed slight bleeding from the incision area, called her surgeon and he was not in the office so came to the ER for evaluation. Patient denies any fevers or chills, states that the pain is minimal, did not take any medications for pain prior to arrival, no other medical complaints. Patient states that when she was discharged after the procedure, she had changed her dressing and noted that when she had taken off her dressing the Dermabond had peeled off with the dressing. Denies any purulent discharge. Chief Complaint: Slight bleeding from her incision site  Onset: Sudden onset  Timing: Noted the symptoms today  Context: Symptoms started spontaneously, symptoms have   not changed since onset  Location: Left breast  Quality: Currently with no pain  Severity: N/A  Modifying Factors: Seems to make the symptoms worse or better  Associated Symptoms: denies any other associated signs or symptoms    PCP: Epifanio Hernandez MD     Current Outpatient Medications   Medication Sig Dispense Refill    albuterol (PROVENTIL HFA, VENTOLIN HFA, PROAIR HFA) 90 mcg/actuation inhaler Take 2 Puffs by inhalation every four (4) hours as needed for Wheezing or Shortness of Breath. 1 Inhaler 1    inhalational spacing device 1 Each by Does Not Apply route as needed (to be used with albuterol HFA.). Please dispense one adult spacer 1 Device 0    tolterodine ER (DETROL LA) 4 mg ER capsule Take 1 Cap by mouth daily.  Indications: Urine Leakage When there is a Strong Desire to Void 90 Cap 3    busPIRone (BUSPAR) 5 mg tablet TAKE 1 TABLET BY MOUTH 3 TIMES DAILY. 5    fluticasone (VERAMYST) 27.5 mcg/actuation nasal spray 55 mcg.  amitriptyline (ELAVIL) 10 mg tablet TAKE 1 TABLET BY MOUTH EVERY NIGHT AT BEDTIME  4    ergocalciferol (ERGOCALCIFEROL) 50,000 unit capsule TAKE ONE CAPSULE BY MOUTH EVERY WEEK  3    norethindrone-e.estradiol-iron (LO LOESTRIN FE) 1 mg-10 mcg (24)/10 mcg (2) tab Take 1 Tab by mouth daily.  celecoxib (CELEBREX) 200 mg capsule Take 200 mg by mouth daily.  cyanocobalamin 1,000 mcg tablet Take 1,000 mcg by mouth daily.  fexofenadine (ALLEGRA) 180 mg tablet Take  by mouth daily. Review of Systems      REVIEW OF SYSTEMS:    CONST: Negative for fever, body aches and chills. HENT: Negative for neck pain/stiffness, headache, congestion, sore throat, swelling. EYES: Negative for discharge/pain or vision changes. RESP: Negative for cough/hemoptysis and shortness of breath. CV: Negative chest pain, difficulty breathing, palpitations. ABD: Negative pain, nausea, vomiting. : Negative increase frequency, dysuria, blood in urine or stool. MUSC: Negative for muscle weakness or edema. SKIN: Negative rash, left breast with slight bloody discharge noted on her dressing. NEURO: Negative headache, dizziness, focal neurological deficits. Past History     I have reviewed all PMHX, FMHX and Social Hx as entered into the medical record in the chart below using the Epic Template.       Past Medical History:  Past Medical History:   Diagnosis Date    Asthma     Blurred vision     Carpal tunnel syndrome     GERD (gastroesophageal reflux disease)     Hearing loss     IBS (irritable bowel syndrome)     Kidney stones     Opitz-Sarah syndrome     Other ill-defined conditions(749.86)     sinus, arthritis, bone disease    [facility]     RA [facility]oid [facility]) [facility]     Urinary frequency  Urinary urgency     Varicose vein        Past Surgical History:  Past Surgical History:   Procedure Laterality Date    HX CARPAL TUNNEL RELEASE      HX CATARACT REMOVAL      HX HERNIA REPAIR      HX UROLOGICAL      Biopsy of urethra       Family History:  No family history on file. Reviewed and non-contributory    Social History:  Social History     Tobacco Use    Smoking status: Never Smoker    Smokeless tobacco: Never Used   Vaping Use    Vaping Use: Never used   Substance Use Topics    Alcohol use: Not Currently     Comment: occ    Drug use: No       Allergies: Allergies   Allergen Reactions    Coconut Rash and Itching     GI distress      Coconut      Other reaction(s): gi distress, mild rash/itching    Coconut Oil Other (comments)     Other reaction(s): gi distress, mild rash/itching    Nuts [Tree Nut] Other (comments)     States found out in allergy testing    Pecan Extract Other (comments)     Other reaction(s): unknown  Positive reaction to allergy skin testing    Pecan Nut Unknown (comments)     Positive reaction to allergy skin testing    Tree Nuts Other (comments)     Other reaction(s): Other (comments)  States found out in allergy testing         Physical Exam     Vitals:    05/04/22 0913   BP: 136/80   Pulse: 95   Resp: 18   Temp: 96.9 °F (36.1 °C)   SpO2: 100%   Weight: 73.5 kg (162 lb)   Height: 4' 11\" (1.499 m)       Physical Exam  Vitals and nursing note reviewed. Constitutional:       General: Pt is not in acute distress. Appearance: Pt is well-developed, not diaphoretic. HENT:      Head: Normocephalic and atraumatic. Ear: External ear normal b/l     Nose: Nose normal.   Eyes:      General: No scleral icterus, EOMI     Pupil: RAFFAELE     Conjunctiva/sclera: Conjunctivae normal.   Cardiovascular:      Rate and Rhythm: Normal rate and regular rhythm. Pulmonary:      Effort: Pulmonary effort is normal. No tachypnea, accessory muscle usage or respiratory distress. Abdominal:      Palpations: Abdomen is soft. Musculoskeletal:         General: Normal range of motion. Cervical back: Normal range of motion. Skin:     General: Skin is warm and dry. Patient with a patient with a vertical and horizontal incision inferior to the left nipple on the left breast which will dehisced. Active signs of bleeding or purulent discharge is appreciated. Surrounding skin with no areas of erythema or warmth on palpation. Dehisced area looks superficial.  With granulomatous tissue already forming around wound edges. Neurological:      Mental Status: Pt is alert and oriented to person, place, and time. Psychiatric:         Behavior: Behavior normal.         Judgment: Judgment normal.       Diagnostic Study Results     Labs -   No results found for this or any previous visit (from the past 12 hour(s)). Radiologic Studies -  No orders to display     CT Results  (Last 48 hours)    None        CXR Results  (Last 48 hours)    None          Medications given in the ED-  Medications   bacitracin 500 unit/gram ointment (500 g Topical Given 5/4/22 1053)         Medical Decision Making   I am the first provider for this patient. I reviewed the vital signs, available nursing notes, past medical history, past surgical history, family history and social history. Vital Signs-Reviewed the patient's vital signs. Pulse Oximetry Analysis - 100% on RA     Cardiac Monitor:  Rate: 95 bpm  Rhythm: sinus rhythm      ED Course:   7:04 PM Initial assessment performed. The patients presenting problems have been discussed, and they are in agreement with the care plan formulated and outlined with them. I have encouraged them to ask questions as they arise throughout their visit. Looks like a old dehiscence after her surgery. No acute signs of infection appreciated. Discussed this with patient.   Discussed that it is likely due to the dehiscence that she will have scarring to the left breast. Wound care provided. We will contact her plastic surgeons office for follow-up, no acute interventions indicated at this time from the ER perspective. ED Course as of 05/04/22 1904   Wed May 04, 2022   1041 Spoke with Dr. Isac Keller office regarding patient's wound dehiscence, no acute interventions were recommended at this time. Patient's wound does not look infected, no active drainage noted. Will cover the area with topical antibiotics and a nonadhesive dressing. Patient to follow-up with Dr. Isac Keller office in the morning tomorrow for further management of the wound. [NN]      ED Course User Index  [NN] Marirismary ErnaDO            Diagnosis and Disposition     DISCHARGE NOTE:    Ev King's  results have been reviewed with her. She has been counseled regarding her diagnosis, treatment, and plan. She verbally conveys understanding and agreement of the signs, symptoms, diagnosis, treatment and prognosis and additionally agrees to follow up as discussed. She also agrees with the care-plan and conveys that all of her questions have been answered. I have also provided discharge instructions for her that include: educational information regarding their diagnosis and treatment, and list of reasons why they would want to return to the ED prior to their follow-up appointment, should her condition change. She has been provided with education for proper emergency department utilization. CLINICAL IMPRESSION:     1. Postoperative wound dehiscence, initial encounter        PLAN:  1. D/C Home  2. Discharge Medication List as of 5/4/2022 10:43 AM        3. Follow-up Information    None           Dragon Disclaimer     Please note that this dictation was completed with [employer], the computer voice recognition software. Quite often unanticipated grammatical, syntax, homophones, and other interpretive errors are inadvertently transcribed by the computer software. Please disregard these errors.   Please excuse any errors that have escaped final proofreading.     Vernell Cline, DO

## 2022-05-04 NOTE — DISCHARGE INSTRUCTIONS
Please follow-up with Dr. Kate Galarza in his office as scheduled for tomorrow. Keep the dressing dry and intact until your appointment time. Please return to the ER for any new or worsening symptoms.

## 2022-05-04 NOTE — ED NOTES
Pt reports the glue covering her incision on left breast came off. Pt called her surgeons office and was told he was in surgery so pt decided to come to ER to get incision re-glued. No signs of infection noted at incision site, scant blood on 4x4 gauze is noted from where pt is holding gauze against incision site, no other drainage noted.  Pt appears to be in NAD

## 2023-01-24 ENCOUNTER — TRANSCRIBE ORDER (OUTPATIENT)
Dept: REGISTRATION | Age: 48
End: 2023-01-24

## 2023-01-24 ENCOUNTER — HOSPITAL ENCOUNTER (OUTPATIENT)
Dept: PREADMISSION TESTING | Age: 48
Discharge: HOME | End: 2023-01-24
Payer: MEDICAID

## 2023-01-24 DIAGNOSIS — J34.2 DEVIATED NASAL SEPTUM: ICD-10-CM

## 2023-01-24 DIAGNOSIS — R09.81 NASAL CONGESTION: Primary | ICD-10-CM

## 2023-01-24 DIAGNOSIS — J34.3 HYPERTROPHY OF NASAL TURBINATES: ICD-10-CM

## 2023-01-24 LAB
ANION GAP SERPL CALC-SCNC: 3 MMOL/L (ref 3–18)
ATRIAL RATE: 91 BPM
BASOPHILS # BLD: 0.1 K/UL (ref 0–0.1)
BASOPHILS NFR BLD: 1 % (ref 0–2)
BUN SERPL-MCNC: 18 MG/DL (ref 7–18)
BUN/CREAT SERPL: 25 (ref 12–20)
CALCIUM SERPL-MCNC: 9.5 MG/DL (ref 8.5–10.1)
CALCULATED P AXIS, ECG09: 62 DEGREES
CALCULATED R AXIS, ECG10: 3 DEGREES
CALCULATED T AXIS, ECG11: 51 DEGREES
CHLORIDE SERPL-SCNC: 106 MMOL/L (ref 100–111)
CO2 SERPL-SCNC: 30 MMOL/L (ref 21–32)
CREAT SERPL-MCNC: 0.73 MG/DL (ref 0.6–1.3)
DIAGNOSIS, 93000: NORMAL
DIFFERENTIAL METHOD BLD: ABNORMAL
EOSINOPHIL # BLD: 0.1 K/UL (ref 0–0.4)
EOSINOPHIL NFR BLD: 1 % (ref 0–5)
ERYTHROCYTE [DISTWIDTH] IN BLOOD BY AUTOMATED COUNT: 14.8 % (ref 11.6–14.5)
EST. AVERAGE GLUCOSE BLD GHB EST-MCNC: 134 MG/DL
GLUCOSE SERPL-MCNC: 141 MG/DL (ref 74–99)
HBA1C MFR BLD: 6.3 % (ref 4.2–5.6)
HCT VFR BLD AUTO: 42.1 % (ref 35–45)
HGB BLD-MCNC: 13.9 G/DL (ref 12–16)
IMM GRANULOCYTES # BLD AUTO: 0.1 K/UL (ref 0–0.04)
IMM GRANULOCYTES NFR BLD AUTO: 1 % (ref 0–0.5)
LYMPHOCYTES # BLD: 3.7 K/UL (ref 0.9–3.6)
LYMPHOCYTES NFR BLD: 37 % (ref 21–52)
MCH RBC QN AUTO: 29.7 PG (ref 24–34)
MCHC RBC AUTO-ENTMCNC: 33 G/DL (ref 31–37)
MCV RBC AUTO: 90 FL (ref 78–100)
MONOCYTES # BLD: 0.6 K/UL (ref 0.05–1.2)
MONOCYTES NFR BLD: 5 % (ref 3–10)
NEUTS SEG # BLD: 5.7 K/UL (ref 1.8–8)
NEUTS SEG NFR BLD: 56 % (ref 40–73)
NRBC # BLD: 0 K/UL (ref 0–0.01)
NRBC BLD-RTO: 0 PER 100 WBC
P-R INTERVAL, ECG05: 168 MS
PLATELET # BLD AUTO: 280 K/UL (ref 135–420)
PMV BLD AUTO: 9.1 FL (ref 9.2–11.8)
POTASSIUM SERPL-SCNC: 3.5 MMOL/L (ref 3.5–5.5)
Q-T INTERVAL, ECG07: 364 MS
QRS DURATION, ECG06: 84 MS
QTC CALCULATION (BEZET), ECG08: 447 MS
RBC # BLD AUTO: 4.68 M/UL (ref 4.2–5.3)
SODIUM SERPL-SCNC: 139 MMOL/L (ref 136–145)
VENTRICULAR RATE, ECG03: 91 BPM
WBC # BLD AUTO: 10.1 K/UL (ref 4.6–13.2)

## 2023-01-24 PROCEDURE — 93005 ELECTROCARDIOGRAM TRACING: CPT

## 2023-01-24 PROCEDURE — 85025 COMPLETE CBC W/AUTO DIFF WBC: CPT

## 2023-01-24 PROCEDURE — 83036 HEMOGLOBIN GLYCOSYLATED A1C: CPT

## 2023-01-24 PROCEDURE — 36415 COLL VENOUS BLD VENIPUNCTURE: CPT

## 2023-01-24 PROCEDURE — 80048 BASIC METABOLIC PNL TOTAL CA: CPT

## 2023-01-24 NOTE — PERIOP NOTES
Neck 15 inches, has sleep anpea-has cpap, but does not use it. Patient has pre-diabetes and reported recent- hgba1c of 6.3. Denies liver, kidney, or autoimmune diseases.

## 2023-01-30 ENCOUNTER — HOSPITAL ENCOUNTER (OUTPATIENT)
Dept: PREADMISSION TESTING | Age: 48
Discharge: HOME | End: 2023-01-30

## 2023-01-30 VITALS — BODY MASS INDEX: 35.54 KG/M2 | WEIGHT: 158 LBS | HEIGHT: 56 IN

## 2023-01-30 RX ORDER — MONTELUKAST SODIUM 10 MG/1
10 TABLET ORAL
COMMUNITY

## 2023-01-30 RX ORDER — LOSARTAN POTASSIUM 25 MG/1
25 TABLET ORAL DAILY
COMMUNITY

## 2023-01-30 RX ORDER — AMLODIPINE BESYLATE 2.5 MG/1
2.5 TABLET ORAL DAILY
COMMUNITY

## 2023-01-30 RX ORDER — CEFAZOLIN SODIUM/WATER 2 G/20 ML
2 SYRINGE (ML) INTRAVENOUS ONCE
Status: CANCELLED | OUTPATIENT
Start: 2023-01-30 | End: 2023-01-30

## 2023-01-30 RX ORDER — ACETAMINOPHEN 500 MG
1000 TABLET ORAL ONCE
Status: CANCELLED | OUTPATIENT
Start: 2023-01-30 | End: 2023-01-30

## 2023-01-30 RX ORDER — OMEPRAZOLE 10 MG/1
10 CAPSULE, DELAYED RELEASE ORAL DAILY
COMMUNITY

## 2023-01-30 NOTE — PERIOP NOTES
No removable prosthetic devices or family history of malignant hyperthermia. Has sleep apnea-aware to bring CPAP dos. Use of antibacterial soap instructions reviewed. PCP is aware of the surgery. No participation in clinical trial or research study. Do not bring any valuables on DOS- jewelry, wallet, cash, laptop, medications. Does meet criteria for special population-[place] in posting notified. Possible time delay day of surgery reviewed. DNR status-none. Covid otzuri-[id number] bring card dos.

## 2023-01-30 NOTE — PERIOP NOTES
Called patient. No answer. Unable to leave message-voice mailbox full.  Will call back in 10 minutes

## 2023-01-31 NOTE — PERIOP NOTES
Faxed request for Dr. Deloris Blackburn notes to his office. Spoke to [facility] at Dr. Deloris Blackburn office. She will forward JAMARCUS from . Pt has preop appt. with Dr. Aldo Milian scheduled for 2-2-23.

## 2023-01-31 NOTE — PERIOP NOTES
Spoke with patient regarding anesthesia consult request. Pt states she will come in on Thursday 2-2-23. Times and location provided.

## 2023-02-05 DIAGNOSIS — R09.81 NASAL CONGESTION: Primary | ICD-10-CM

## 2023-02-05 DIAGNOSIS — J34.2 DEVIATED NASAL SEPTUM: ICD-10-CM

## 2023-02-05 DIAGNOSIS — J34.3 HYPERTROPHY OF NASAL TURBINATES: ICD-10-CM

## 2023-03-07 ENCOUNTER — ANESTHESIA EVENT (OUTPATIENT)
Facility: HOSPITAL | Age: 48
End: 2023-03-07
Payer: MEDICAID

## 2023-03-08 ENCOUNTER — ANESTHESIA (OUTPATIENT)
Facility: HOSPITAL | Age: 48
End: 2023-03-08
Payer: MEDICAID

## 2023-03-08 ENCOUNTER — HOSPITAL ENCOUNTER (OUTPATIENT)
Facility: HOSPITAL | Age: 48
Setting detail: OUTPATIENT SURGERY
Discharge: HOME | End: 2023-03-08
Attending: OTOLARYNGOLOGY | Admitting: OTOLARYNGOLOGY
Payer: MEDICAID

## 2023-03-08 VITALS
RESPIRATION RATE: 20 BRPM | HEIGHT: 55 IN | SYSTOLIC BLOOD PRESSURE: 138 MMHG | WEIGHT: 160.2 LBS | TEMPERATURE: 97.9 F | OXYGEN SATURATION: 96 % | HEART RATE: 89 BPM | DIASTOLIC BLOOD PRESSURE: 78 MMHG | BODY MASS INDEX: 37.08 KG/M2

## 2023-03-08 LAB
GLUCOSE BLD STRIP.AUTO-MCNC: 116 MG/DL (ref 70–110)
HCG UR QL: NEGATIVE

## 2023-03-08 PROCEDURE — 82962 GLUCOSE BLOOD TEST: CPT

## 2023-03-08 PROCEDURE — 7100000011 HC PHASE II RECOVERY - ADDTL 15 MIN: Performed by: OTOLARYNGOLOGY

## 2023-03-08 PROCEDURE — 7100000001 HC PACU RECOVERY - ADDTL 15 MIN: Performed by: OTOLARYNGOLOGY

## 2023-03-08 PROCEDURE — 6360000002 HC RX W HCPCS: Performed by: STUDENT IN AN ORGANIZED HEALTH CARE EDUCATION/TRAINING PROGRAM

## 2023-03-08 PROCEDURE — 2500000003 HC RX 250 WO HCPCS: Performed by: NURSE ANESTHETIST, CERTIFIED REGISTERED

## 2023-03-08 PROCEDURE — 6360000002 HC RX W HCPCS: Performed by: OTOLARYNGOLOGY

## 2023-03-08 PROCEDURE — 88305 TISSUE EXAM BY PATHOLOGIST: CPT

## 2023-03-08 PROCEDURE — 3600000002 HC SURGERY LEVEL 2 BASE: Performed by: OTOLARYNGOLOGY

## 2023-03-08 PROCEDURE — 6360000002 HC RX W HCPCS: Performed by: NURSE ANESTHETIST, CERTIFIED REGISTERED

## 2023-03-08 PROCEDURE — 2709999900 HC NON-CHARGEABLE SUPPLY: Performed by: OTOLARYNGOLOGY

## 2023-03-08 PROCEDURE — C9046 COCAINE HCL NASAL SOLUTION: HCPCS | Performed by: OTOLARYNGOLOGY

## 2023-03-08 PROCEDURE — 2500000003 HC RX 250 WO HCPCS: Performed by: OTOLARYNGOLOGY

## 2023-03-08 PROCEDURE — 6370000000 HC RX 637 (ALT 250 FOR IP): Performed by: OTOLARYNGOLOGY

## 2023-03-08 PROCEDURE — 2580000003 HC RX 258: Performed by: OTOLARYNGOLOGY

## 2023-03-08 PROCEDURE — 81025 URINE PREGNANCY TEST: CPT

## 2023-03-08 PROCEDURE — 7100000010 HC PHASE II RECOVERY - FIRST 15 MIN: Performed by: OTOLARYNGOLOGY

## 2023-03-08 PROCEDURE — 3700000000 HC ANESTHESIA ATTENDED CARE: Performed by: OTOLARYNGOLOGY

## 2023-03-08 PROCEDURE — 3600000012 HC SURGERY LEVEL 2 ADDTL 15MIN: Performed by: OTOLARYNGOLOGY

## 2023-03-08 PROCEDURE — 3700000001 HC ADD 15 MINUTES (ANESTHESIA): Performed by: OTOLARYNGOLOGY

## 2023-03-08 PROCEDURE — 7100000000 HC PACU RECOVERY - FIRST 15 MIN: Performed by: OTOLARYNGOLOGY

## 2023-03-08 RX ORDER — MIDAZOLAM HYDROCHLORIDE 2 MG/2ML
2 INJECTION, SOLUTION INTRAMUSCULAR; INTRAVENOUS
Status: DISCONTINUED | OUTPATIENT
Start: 2023-03-08 | End: 2023-03-08 | Stop reason: HOSPADM

## 2023-03-08 RX ORDER — SODIUM CHLORIDE, SODIUM LACTATE, POTASSIUM CHLORIDE, CALCIUM CHLORIDE 600; 310; 30; 20 MG/100ML; MG/100ML; MG/100ML; MG/100ML
INJECTION, SOLUTION INTRAVENOUS CONTINUOUS
Status: DISCONTINUED | OUTPATIENT
Start: 2023-03-08 | End: 2023-03-08 | Stop reason: HOSPADM

## 2023-03-08 RX ORDER — KETAMINE HCL IN NACL, ISO-OSM 100MG/10ML
SYRINGE (ML) INJECTION PRN
Status: DISCONTINUED | OUTPATIENT
Start: 2023-03-08 | End: 2023-03-08 | Stop reason: SDUPTHER

## 2023-03-08 RX ORDER — ACETAMINOPHEN 500 MG
1000 TABLET ORAL ONCE
Status: COMPLETED | OUTPATIENT
Start: 2023-03-08 | End: 2023-03-08

## 2023-03-08 RX ORDER — COCAINE HYDROCHLORIDE 40 MG/ML
SOLUTION NASAL PRN
Status: DISCONTINUED | OUTPATIENT
Start: 2023-03-08 | End: 2023-03-08 | Stop reason: ALTCHOICE

## 2023-03-08 RX ORDER — GLYCOPYRROLATE 0.2 MG/ML
INJECTION INTRAMUSCULAR; INTRAVENOUS PRN
Status: DISCONTINUED | OUTPATIENT
Start: 2023-03-08 | End: 2023-03-08 | Stop reason: SDUPTHER

## 2023-03-08 RX ORDER — ONDANSETRON 2 MG/ML
INJECTION INTRAMUSCULAR; INTRAVENOUS PRN
Status: DISCONTINUED | OUTPATIENT
Start: 2023-03-08 | End: 2023-03-08 | Stop reason: SDUPTHER

## 2023-03-08 RX ORDER — FENTANYL CITRATE 50 UG/ML
50 INJECTION, SOLUTION INTRAMUSCULAR; INTRAVENOUS EVERY 5 MIN PRN
Status: DISCONTINUED | OUTPATIENT
Start: 2023-03-08 | End: 2023-03-08 | Stop reason: HOSPADM

## 2023-03-08 RX ORDER — PROPOFOL 10 MG/ML
INJECTION, EMULSION INTRAVENOUS PRN
Status: DISCONTINUED | OUTPATIENT
Start: 2023-03-08 | End: 2023-03-08 | Stop reason: SDUPTHER

## 2023-03-08 RX ORDER — ONDANSETRON 2 MG/ML
4 INJECTION INTRAMUSCULAR; INTRAVENOUS
Status: DISCONTINUED | OUTPATIENT
Start: 2023-03-08 | End: 2023-03-08 | Stop reason: HOSPADM

## 2023-03-08 RX ORDER — LIDOCAINE HYDROCHLORIDE 20 MG/ML
INJECTION, SOLUTION EPIDURAL; INFILTRATION; INTRACAUDAL; PERINEURAL PRN
Status: DISCONTINUED | OUTPATIENT
Start: 2023-03-08 | End: 2023-03-08 | Stop reason: SDUPTHER

## 2023-03-08 RX ORDER — FENTANYL CITRATE 50 UG/ML
INJECTION, SOLUTION INTRAMUSCULAR; INTRAVENOUS PRN
Status: DISCONTINUED | OUTPATIENT
Start: 2023-03-08 | End: 2023-03-08 | Stop reason: SDUPTHER

## 2023-03-08 RX ORDER — CYCLOBENZAPRINE HCL 5 MG
TABLET ORAL
COMMUNITY

## 2023-03-08 RX ORDER — LABETALOL HYDROCHLORIDE 5 MG/ML
10 INJECTION, SOLUTION INTRAVENOUS
Status: DISCONTINUED | OUTPATIENT
Start: 2023-03-08 | End: 2023-03-08 | Stop reason: HOSPADM

## 2023-03-08 RX ORDER — HYDROMORPHONE HYDROCHLORIDE 1 MG/ML
0.5 INJECTION, SOLUTION INTRAMUSCULAR; INTRAVENOUS; SUBCUTANEOUS EVERY 5 MIN PRN
Status: DISCONTINUED | OUTPATIENT
Start: 2023-03-08 | End: 2023-03-08 | Stop reason: HOSPADM

## 2023-03-08 RX ORDER — SODIUM CHLORIDE 0.9 % (FLUSH) 0.9 %
5-40 SYRINGE (ML) INJECTION EVERY 12 HOURS SCHEDULED
Status: DISCONTINUED | OUTPATIENT
Start: 2023-03-08 | End: 2023-03-08 | Stop reason: HOSPADM

## 2023-03-08 RX ORDER — DEXAMETHASONE SODIUM PHOSPHATE 4 MG/ML
INJECTION, SOLUTION INTRA-ARTICULAR; INTRALESIONAL; INTRAMUSCULAR; INTRAVENOUS; SOFT TISSUE PRN
Status: DISCONTINUED | OUTPATIENT
Start: 2023-03-08 | End: 2023-03-08 | Stop reason: SDUPTHER

## 2023-03-08 RX ORDER — SODIUM CHLORIDE 9 MG/ML
INJECTION, SOLUTION INTRAVENOUS PRN
Status: DISCONTINUED | OUTPATIENT
Start: 2023-03-08 | End: 2023-03-08 | Stop reason: HOSPADM

## 2023-03-08 RX ORDER — DIPHENHYDRAMINE HYDROCHLORIDE 50 MG/ML
12.5 INJECTION INTRAMUSCULAR; INTRAVENOUS
Status: DISCONTINUED | OUTPATIENT
Start: 2023-03-08 | End: 2023-03-08 | Stop reason: HOSPADM

## 2023-03-08 RX ORDER — SODIUM CHLORIDE 0.9 % (FLUSH) 0.9 %
5-40 SYRINGE (ML) INJECTION PRN
Status: DISCONTINUED | OUTPATIENT
Start: 2023-03-08 | End: 2023-03-08 | Stop reason: HOSPADM

## 2023-03-08 RX ORDER — DROPERIDOL 2.5 MG/ML
0.62 INJECTION, SOLUTION INTRAMUSCULAR; INTRAVENOUS
Status: DISCONTINUED | OUTPATIENT
Start: 2023-03-08 | End: 2023-03-08 | Stop reason: HOSPADM

## 2023-03-08 RX ORDER — MIDAZOLAM HYDROCHLORIDE 1 MG/ML
INJECTION INTRAMUSCULAR; INTRAVENOUS PRN
Status: DISCONTINUED | OUTPATIENT
Start: 2023-03-08 | End: 2023-03-08 | Stop reason: SDUPTHER

## 2023-03-08 RX ORDER — OXYMETAZOLINE HYDROCHLORIDE 0.05 G/100ML
SPRAY NASAL PRN
Status: DISCONTINUED | OUTPATIENT
Start: 2023-03-08 | End: 2023-03-08 | Stop reason: ALTCHOICE

## 2023-03-08 RX ORDER — GINSENG 100 MG
CAPSULE ORAL PRN
Status: DISCONTINUED | OUTPATIENT
Start: 2023-03-08 | End: 2023-03-08 | Stop reason: ALTCHOICE

## 2023-03-08 RX ORDER — ESMOLOL HYDROCHLORIDE 10 MG/ML
INJECTION INTRAVENOUS PRN
Status: DISCONTINUED | OUTPATIENT
Start: 2023-03-08 | End: 2023-03-08 | Stop reason: SDUPTHER

## 2023-03-08 RX ADMIN — Medication 10 MG: at 09:55

## 2023-03-08 RX ADMIN — FENTANYL CITRATE 25 MCG: 50 INJECTION, SOLUTION INTRAMUSCULAR; INTRAVENOUS at 09:43

## 2023-03-08 RX ADMIN — Medication 20 MG: at 09:39

## 2023-03-08 RX ADMIN — Medication 2000 MG: at 09:41

## 2023-03-08 RX ADMIN — FENTANYL CITRATE 25 MCG: 50 INJECTION, SOLUTION INTRAMUSCULAR; INTRAVENOUS at 09:56

## 2023-03-08 RX ADMIN — PROPOFOL 200 MG: 10 INJECTION, EMULSION INTRAVENOUS at 09:35

## 2023-03-08 RX ADMIN — MIDAZOLAM 2 MG: 1 INJECTION INTRAMUSCULAR; INTRAVENOUS at 09:28

## 2023-03-08 RX ADMIN — FENTANYL CITRATE 25 MCG: 50 INJECTION, SOLUTION INTRAMUSCULAR; INTRAVENOUS at 09:33

## 2023-03-08 RX ADMIN — HYDROMORPHONE HYDROCHLORIDE 0.5 MG: 1 INJECTION, SOLUTION INTRAMUSCULAR; INTRAVENOUS; SUBCUTANEOUS at 10:52

## 2023-03-08 RX ADMIN — GLYCOPYRROLATE 0.2 MG: 0.2 INJECTION INTRAMUSCULAR; INTRAVENOUS at 09:28

## 2023-03-08 RX ADMIN — FENTANYL CITRATE 25 MCG: 50 INJECTION, SOLUTION INTRAMUSCULAR; INTRAVENOUS at 10:13

## 2023-03-08 RX ADMIN — ONDANSETRON HYDROCHLORIDE 4 MG: 2 INJECTION INTRAMUSCULAR; INTRAVENOUS at 10:21

## 2023-03-08 RX ADMIN — DEXAMETHASONE SODIUM PHOSPHATE 4 MG: 4 INJECTION, SOLUTION INTRAMUSCULAR; INTRAVENOUS at 09:40

## 2023-03-08 RX ADMIN — SODIUM CHLORIDE, SODIUM LACTATE, POTASSIUM CHLORIDE, AND CALCIUM CHLORIDE: 600; 310; 30; 20 INJECTION, SOLUTION INTRAVENOUS at 08:24

## 2023-03-08 RX ADMIN — Medication 10 MG: at 10:37

## 2023-03-08 RX ADMIN — SODIUM CHLORIDE, SODIUM LACTATE, POTASSIUM CHLORIDE, AND CALCIUM CHLORIDE: 600; 310; 30; 20 INJECTION, SOLUTION INTRAVENOUS at 10:13

## 2023-03-08 RX ADMIN — ESMOLOL HYDROCHLORIDE 20 MG: 10 INJECTION, SOLUTION INTRAVENOUS at 10:37

## 2023-03-08 RX ADMIN — LIDOCAINE HYDROCHLORIDE 80 MG: 20 INJECTION, SOLUTION EPIDURAL; INFILTRATION; INTRACAUDAL; PERINEURAL at 09:34

## 2023-03-08 RX ADMIN — FENTANYL CITRATE 50 MCG: 50 INJECTION INTRAMUSCULAR; INTRAVENOUS at 11:21

## 2023-03-08 RX ADMIN — ACETAMINOPHEN 1000 MG: 500 TABLET ORAL at 08:29

## 2023-03-08 ASSESSMENT — PAIN SCALES - GENERAL
PAINLEVEL_OUTOF10: 0
PAINLEVEL_OUTOF10: 0
PAINLEVEL_OUTOF10: 4
PAINLEVEL_OUTOF10: 0
PAINLEVEL_OUTOF10: 4
PAINLEVEL_OUTOF10: 0
PAINLEVEL_OUTOF10: 5
PAINLEVEL_OUTOF10: 7

## 2023-03-08 ASSESSMENT — PAIN DESCRIPTION - DESCRIPTORS
DESCRIPTORS: BURNING
DESCRIPTORS: BURNING;DISCOMFORT
DESCRIPTORS: ACHING;THROBBING
DESCRIPTORS: ACHING

## 2023-03-08 ASSESSMENT — PAIN DESCRIPTION - LOCATION
LOCATION: HEAD;NOSE
LOCATION: NOSE
LOCATION: HEAD;NOSE
LOCATION: NOSE

## 2023-03-08 ASSESSMENT — PAIN DESCRIPTION - PAIN TYPE: TYPE: SURGICAL PAIN

## 2023-03-08 ASSESSMENT — PAIN DESCRIPTION - FREQUENCY: FREQUENCY: INTERMITTENT

## 2023-03-08 NOTE — ANESTHESIA PRE PROCEDURE
Department of Anesthesiology  Preprocedure Note       Name:  John Rene   Age:  56 y.o.  :  1975                                          MRN:  [id number]         Date:  3/8/2023      Surgeon: Peri Michelle):  Malvin Boxer, DO    Procedure: Procedure(s):  SEPTOPLASTY, SUBMUCOSAL RESECTION OF TURBINATES, MAXILLARY ANTROSTOMY (SPEC POP)    Medications prior to admission:   Prior to Admission medications    Medication Sig Start Date End Date Taking?  Authorizing Provider   cyclobenzaprine (FLEXERIL) 5 MG tablet Cyclobenzaprine Oral  Oral 5.0 mg     active    Historical Provider, MD   hydroCHLOROthiazide (MICROZIDE) 12.5 MG capsule Take 12.5 mg by mouth daily  Patient not taking: Reported on 3/8/2023    Historical Provider, MD   fluticasone-salmeterol (ADVAIR HFA) 230-21 MCG/ACT inhaler Inhale 2 puffs into the lungs 2 times daily    Historical Provider, MD   ipratropium (ATROVENT) 0.06 % nasal spray 2 sprays by Each Nostril route 4 times daily    Historical Provider, MD   losartan (COZAAR) 25 MG tablet Take 25 mg by mouth daily    Ar Automatic Reconciliation   montelukast (SINGULAIR) 10 MG tablet Take 10 mg by mouth nightly    Ar Automatic Reconciliation   omeprazole (PRILOSEC) 10 MG delayed release capsule Take 10 mg by mouth daily    Ar Automatic Reconciliation   albuterol sulfate HFA (PROVENTIL;VENTOLIN;PROAIR) 108 (90 Base) MCG/ACT inhaler Inhale 2 puffs into the lungs every 4 hours as needed 21   Ar Automatic Reconciliation   busPIRone (BUSPAR) 5 MG tablet TAKE 1 TABLET BY MOUTH 3 TIMES DAILY. 18   Ar Automatic Reconciliation   celecoxib (CELEBREX) 200 MG capsule Take 200 mg by mouth daily    Ar Automatic Reconciliation   cyanocobalamin 1000 MCG tablet Take 1,000 mcg by mouth daily    Ar Automatic Reconciliation   fexofenadine (ALLEGRA) 180 MG tablet Take by mouth daily    Ar Automatic Reconciliation       Current medications:    Current Facility-Administered Medications   Medication Dose Route Frequency Provider Last Rate Last Admin    ceFAZolin (ANCEF) 2000 mg in sterile water 20 mL IV syringe  2,000 mg IntraVENous On Call to Oly Kim, DO        lactated ringers IV soln infusion   IntraVENous Continuous Rachel Desouza  mL/hr at 03/08/23 0824 New Bag at 03/08/23 8522       Allergies:     Allergies   Allergen Reactions    Latex Hives and Itching    Coconut Fatty Acids Itching, Rash and Other (See Comments)     Other reaction(s): gi distress, mild rash/itching  GI distress    Other reaction(s): gi distress, mild rash/itching    Coconut Oil Other (See Comments)     Other reaction(s): gi distress, mild rash/itching  Other reaction(s): gi distress, mild rash/itching    Macadamia Nut Oil Other (See Comments)     States found out in allergy testing  States found out in allergy testing    Pecan Extract Allergy Skin Test Other (See Comments)     Other reaction(s): Unknown (comments)  Other reaction(s): unknown  Positive reaction to allergy skin testing  Positive reaction to allergy skin testing    Other reaction(s): unknown  Positive reaction to allergy skin testing  Other reaction(s): Unknown (comments)  Positive reaction to allergy skin testing       Problem List:    Patient Active Problem List   Diagnosis Code    Dysuria-frequency syndrome N34.3       Past Medical History:        Diagnosis Date    Allergies 1996    chronic    Anxiety and depression 2008    Asthma 1995    Breast cancer ([facility] [address] 2021    Right breast- surgery, no chemo or radiation tx    Carpal tunnel syndrome 2012    bilateral, had surgery    Chronic pain 2000    all over    GERD (gastroesophageal reflux disease) 2013    on meds    Hypertension 2005    IBS (irritable bowel syndrome) 2007    Kidney stones 2008    no surgery, left side    Lactose intolerance     Opitz-Guidry syndrome 2003    PAD (peripheral artery disease) ([facility] [address]     denies    Prediabetes 2019    not on meds, recent hga1c 6.3 on 1/24/2023    RA (rheumatoid arthritis) ([facility] [address] 2000    Rheumatoid arthritis ([facility] [address]     Sleep apnea 2020    uses CPAP    Stress incontinence     Urinary frequency 2013    Varicose vein 2006       Past Surgical History:        Procedure Laterality Date    BREAST RECONSTRUCTION Right 04/2022    REPLACEMENT, TISSUE EXPANDER WITH PERMANENT PROSTHESIS    CARPAL TUNNEL RELEASE Right 2012    CARPAL TUNNEL RELEASE Left 03/2022    CATARACT REMOVAL Bilateral 2012    right in 2012, left in 2014    MASTECTOMY Right 12/15/2021    UROLOGICAL SURGERY  2006    Polpys removed       Social History:    Social History     Tobacco Use    Smoking status: Never    Smokeless tobacco: Never   Substance Use Topics    Alcohol use: Yes     Alcohol/week: 1.0 standard drink     Comment: beer two to three times a week                                Counseling given: Not Answered      Vital Signs (Current):   Vitals:    03/08/23 0720   BP: (!) 136/90   Pulse: 90   Resp: 16   Temp: 96.9 °F (36.1 °C)   TempSrc: Temporal   SpO2: 100%   Weight: 160 lb 3.2 oz (72.7 kg)   Height: 4' 7\" (1.397 m)                                              BP Readings from Last 3 Encounters:   03/08/23 (!) 136/90       NPO Status: Time of last liquid consumption: 0530                        Time of last solid consumption: 2000                        Date of last liquid consumption: 03/08/23                        Date of last solid food consumption: 03/07/23    BMI:   Wt Readings from Last 3 Encounters:   03/08/23 160 lb 3.2 oz (72.7 kg)   03/06/23 152 lb (68.9 kg)   01/30/23 158 lb (71.7 kg)     Body mass index is 37.23 kg/m².     CBC:   Lab Results   Component Value Date/Time    WBC 10.1 01/24/2023 02:39 PM    RBC 4.68 01/24/2023 02:39 PM    HGB 13.9 01/24/2023 02:39 PM    HCT 42.1 01/24/2023 02:39 PM    MCV 90.0 01/24/2023 02:39 PM    RDW 14.8 01/24/2023 02:39 PM     01/24/2023 02:39 PM       CMP:   Lab Results   Component Value Date/Time     01/24/2023 02:39 PM    K 3.5 01/24/2023 02:39 PM     01/24/2023 02:39 PM    CO2 30 01/24/2023 02:39 PM    BUN 18 01/24/2023 02:39 PM    CREATININE 0.73 01/24/2023 02:39 PM    GFRAA >60 11/11/2021 01:37 PM    AGRATIO 0.9 02/24/2021 10:22 AM    GLUCOSE 141 01/24/2023 02:39 PM    PROT 7.4 02/24/2021 10:22 AM    CALCIUM 9.5 01/24/2023 02:39 PM    BILITOT 0.5 02/24/2021 10:22 AM    ALKPHOS 69 02/24/2021 10:22 AM    AST 15 02/24/2021 10:22 AM    ALT 26 02/24/2021 10:22 AM       POC Tests:   Recent Labs     03/08/23  0823   POCGLU 116*       Coags: No results found for: PROTIME, INR, APTT    HCG (If Applicable):   Lab Results   Component Value Date    PREGTESTUR Negative 03/08/2023        ABGs: No results found for: PHART, PO2ART, OAR8QYF, FUV1LOY, BEART, [id number]     Type & Screen (If Applicable):  No results found for: LABABO, LABRH    Drug/Infectious Status (If Applicable):  No results found for: HIV, HEPCAB    COVID-19 Screening (If Applicable): No results found for: COVID19        Anesthesia Evaluation  Patient summary reviewed and Nursing notes reviewed no history of anesthetic complications:   Airway: Mallampati: III  TM distance: >3 FB   Neck ROM: full  Mouth opening: > = 3 FB   Dental:    (+) poor dentition      Pulmonary: breath sounds clear to auscultation  (+) sleep apnea: on CPAP,  asthma:     (-) COPD                           Cardiovascular:    (+) hypertension:,     (-) past MI and  CHF      Rhythm: regular  Rate: normal                    Neuro/Psych:   (+) depression/anxiety    (-) seizures and CVA           GI/Hepatic/Renal:   (+) GERD: well controlled,      (-) liver disease and no renal disease       Endo/Other:    (+) Diabetes (Prediabetic), : arthritis: rheumatoid. , .                 Abdominal:             Vascular:   + PVD, aortic or cerebral, . Other Findings:           Anesthesia Plan      general     ASA 2       Induction: intravenous.     MIPS: Postoperative opioids intended and Prophylactic antiemetics administered. Anesthetic plan and risks discussed with patient. Plan discussed with CRNA.                     Floyd Ernst MD   3/8/2023

## 2023-03-08 NOTE — PERIOP NOTE
Awake and alert complain of headache, and dry mouth bp elevated nasal drip pad saturated. Head of bed adjusted ice pack to bridge of nose and back of neck changed drip pad.

## 2023-03-08 NOTE — PROGRESS NOTES
Operative Report    Indications: This is a 56 yrs female who presents with nasal congestion. She was positive for nsd, ct findings of right chronic sinusitis. The patient was admitted for surgery as conservative measures have failed. Date of Procedure: 3/8/23    Procedure Name: Procedure(s):  SEPTOPLASTY, SUBMUCOSAL RESECTION OF TURBINATES, MAXILLARY ANTROSTOMY (SPEC POP)    Preoperative Diagnosis: NASAL CONGESTION, DEVIATED NASAL SEPTUM, NASAL TURBINATE HYPERTROPY, max sinusitis right     Postoperative Diagnosis:  suzie    Surgeon(s):  Constance Flores DO    Assistant: Circulator: Shelby Weber RN  Surgical Assistant: Fuad Soriano Circulator: Roger Qiu RN  Scrub Person First: Jamie Nicole RN    Anesthesia:  General    Findings:  right nsd    Estimated Blood Loss:  25ml    Specimens: right sinus contents          Implants: * No implants in log *         Complications:  None; patient tolerated the procedure well.     Signed By: Constance Flores DO     March 8, 2023

## 2023-03-08 NOTE — ANESTHESIA POSTPROCEDURE EVALUATION
Department of Anesthesiology  Postprocedure Note    Patient: Dana Gutierrez  MRN: [id number]  YOB: 1975  Date of evaluation: 3/8/2023      Procedure Summary     Date: 03/08/23 Room / Location: [facility] MAIN 02 / [facility] MAIN OR    Anesthesia Start: 0928 Anesthesia Stop: 1040    Procedure: SEPTOPLASTY, SUBMUCOSAL RESECTION OF TURBINATES, MAXILLARY ANTROSTOMY (SPEC POP) (Nose) Diagnosis:       Nasal congestion      (NASAL CONGESTION, DEVIATED NASAL SEPTUM, NASAL TURBINATE HYPERTROPY)    Surgeons: Curtis Jaeger DO Responsible Provider: Balta Antunez MD    Anesthesia Type: General ASA Status: 2          Anesthesia Type: General    Ivan Phase I: Ivan Score: 10    Ivan Phase II: Ivan Score: 10      Anesthesia Post Evaluation    Patient location during evaluation: PACU  Patient participation: complete - patient participated  Level of consciousness: awake and alert  Airway patency: patent  Nausea & Vomiting: no nausea and no vomiting  Complications: no  Cardiovascular status: blood pressure returned to baseline  Respiratory status: acceptable  Hydration status: euvolemic

## 2023-03-08 NOTE — INTERVAL H&P NOTE
1  Update History & Physical    The patient's History and Physical of March 3, 2023 was reviewed with the patient and I examined the patient. There was right maxiallry sinusitis with opacification on CT scan. . The surgical site was confirmed by the patient and me. Plan: The risks, benefits, expected outcome, and alternative to the recommended procedure have been discussed with the patient. Patient understands and wants to proceed with the procedure.      Electronically signed by Too Gilliam DO on 3/8/2023 at 9:09 AM

## 2023-03-08 NOTE — PERIOP NOTE
Dr. Josef Duarte called to bedside notified nasal drip pad changed and large blood clot to right nare noted. Dr. Josef Duarte suctioned nares and drip pad was changed vital signs with in limits pain under control.

## 2023-03-08 NOTE — PERIOP NOTE
TRANSFER - IN REPORT:    Verbal report received from Maria Elena Bro on Shen Winchester  being received from PACU for routine post-op      Report consisted of patient's Situation, Background, Assessment and   Recommendations(SBAR). Information from the following report(s) Nurse Handoff Report, Surgery Report, and MAR was reviewed with the receiving nurse. Opportunity for questions and clarification was provided. Assessment completed upon patient's arrival to unit and care assumed.

## 2023-03-08 NOTE — PERIOP NOTE
TRANSFER - IN REPORT:    Verbal report received from [facility]  on Elen Patrick  being received from PACU for routine post-op      Report consisted of patient's Situation, Background, Assessment and   Recommendations(SBAR). Information from the following report(s) Nurse Handoff Report, Surgery Report, and MAR was reviewed with the receiving nurse. Opportunity for questions and clarification was provided. Assessment completed upon patient's arrival to unit and care assumed.

## 2023-03-08 NOTE — PERIOP NOTE
Patient took celebrex 200mg this morning- notified Dr. Ashlee Wiggins. OK to proceed. rv in January  Refer to rad onc

## 2023-03-08 NOTE — PERIOP NOTE
TRANSFER - OUT REPORT:    Verbal report given to CHRISTINE Batista on Dana Gutierrez  being transferred to Phase 2 for routine post-op       Report consisted of patient's Situation, Background, Assessment and   Recommendations(SBAR).     Information from the following report(s) Nurse Handoff Report was reviewed with the receiving nurse.  [place] Assessment: No data recorded  Lines:   Peripheral IV 03/08/23 Left Hand (Active)   Site Assessment Clean, dry & intact 03/08/23 1126   Line Status Infusing 03/08/23 0831   Phlebitis Assessment No symptoms 03/08/23 1126   Infiltration Assessment 0 03/08/23 1126   Dressing Status Clean, dry & intact 03/08/23 1126   Dressing Type Transparent 03/08/23 1126        Opportunity for questions and clarification was provided.      Patient transported with:  Tech

## 2023-03-08 NOTE — PERIOP NOTE
Reviewed PTA medication list with patient/caregiver and patient/caregiver denies any additional medications. Patient admits to having a responsible adult care for them at home for at least 24 hours after surgery. Patient encouraged to use gown warming system and informed that using said warming gown to regulate body temperature prior to a procedure has been shown to help reduce the risks of blood clots and infection. Patient's pharmacy of choice verified and documented in PTA medication section.     Dual skin assessment & fall risk band verification completed with Arianna MARTINEZ RN.

## 2023-03-08 NOTE — DISCHARGE INSTRUCTIONS
Take rx'ed pain medications and antibiotics as directed   Sleep with head elevated  Ice as needed  No heavy lifting or bending    DISCHARGE SUMMARY from Nurse    PATIENT INSTRUCTIONS:    After general anesthesia or intravenous sedation, for 24 hours or while taking prescription Narcotics:  Limit your activities  Do not drive and operate hazardous machinery  Do not make important personal or business decisions  Do  not drink alcoholic beverages  If you have not urinated within 8 hours after discharge, please contact your surgeon on call. Report the following to your surgeon:  Excessive pain, swelling, redness or odor of or around the surgical area  Temperature over 100.5  Nausea and vomiting lasting longer than 4 hours or if unable to take medications  Any signs of decreased circulation or nerve impairment to extremity: change in color, persistent  numbness, tingling, coldness or increase pain  Any questions    What to do at Home:  Recommended activity: activity as tolerated. If you experience any of the following symptoms as stated above, please follow up with Dr. Jac James. *  Please give a list of your current medications to your Primary Care Provider. *  Please update this list whenever your medications are discontinued, doses are      changed, or new medications (including over-the-counter products) are added. *  Please carry medication information at all times in case of emergency situations. These are general instructions for a healthy lifestyle:    No smoking/ No tobacco products/ Avoid exposure to second hand smoke  Surgeon General's Warning:  Quitting smoking now greatly reduces serious risk to your health.     Obesity, smoking, and sedentary lifestyle greatly increases your risk for illness    A healthy diet, regular physical exercise & weight monitoring are important for maintaining a healthy lifestyle    You may be retaining fluid if you have a history of heart failure or if you experience any of the following symptoms:  Weight gain of 3 pounds or more overnight or 5 pounds in a week, increased swelling in our hands or feet or shortness of breath while lying flat in bed. Please call your doctor as soon as you notice any of these symptoms; do not wait until your next office visit. The discharge information has been reviewed with the patient and caregiver. The patient and caregiver verbalized understanding. Discharge medications reviewed with the patient and caregiver and appropriate educational materials and side effects teaching were provided. Patient armband removed and shredded. ___________________________________________________________________________________________________________________________________  DISCHARGE SUMMARY from Nurse    PATIENT INSTRUCTIONS:    After general anesthesia or intravenous sedation, for 24 hours or while taking prescription Narcotics:  Limit your activities  Do not drive and operate hazardous machinery  Do not make important personal or business decisions  Do  not drink alcoholic beverages  If you have not urinated within 8 hours after discharge, please contact your surgeon on call. Report the following to your surgeon:  Excessive pain, swelling, redness or odor of or around the surgical area  Temperature over 100.5  Nausea and vomiting lasting longer than 4 hours or if unable to take medications  Any signs of decreased circulation or nerve impairment to extremity: change in color, persistent  numbness, tingling, coldness or increase pain  Any questions    What to do at Home:  Recommended activity: activity as tolerated and no driving for today        *  Please give a list of your current medications to your Primary Care Provider. *  Please update this list whenever your medications are discontinued, doses are      changed, or new medications (including over-the-counter products) are added.     *  Please carry medication information at all times in case of emergency situations. These are general instructions for a healthy lifestyle:    No smoking/ No tobacco products/ Avoid exposure to second hand smoke  Surgeon General's Warning:  Quitting smoking now greatly reduces serious risk to your health. Obesity, smoking, and sedentary lifestyle greatly increases your risk for illness    A healthy diet, regular physical exercise & weight monitoring are important for maintaining a healthy lifestyle    You may be retaining fluid if you have a history of heart failure or if you experience any of the following symptoms:  Weight gain of 3 pounds or more overnight or 5 pounds in a week, increased swelling in our hands or feet or shortness of breath while lying flat in bed. Please call your doctor as soon as you notice any of these symptoms; do not wait until your next office visit. The discharge information has been reviewed with the patient and caregiver. The patient and caregiver verbalized understanding. Discharge medications reviewed with the patient and caregiver and appropriate educational materials and side effects teaching were provided. Patient armband removed and shredded   ___________________________________________________________________________________________________________________________________     Nasal Septum Repair Surgery: What to Expect at [address] may have some swelling of your nose, upper lip, cheeks, or around your eyes after nasal surgery. You may have some bruises around your nose and eyes. Your nose may be sore and will bleed. This may last for several days after surgery. The tip of your nose and your upper lip and gums may be numb. Feeling will return in a few weeks to a few months. Your sense of smell may not be as good after surgery. But it will improve and will often return to normal in 1 to 2 months. You will have a drip pad under your nose to collect mucus and blood.  Change it only when it bleeds through. You may have to do this every hour for 24 hours after surgery. You will probably be able to return to work or school in a few days and to your normal routine in about 3 weeks. But this varies with your job and how much surgery you had. Most people recover fully in 1 to 2 months. You will have to visit your doctor during the 3 to 4 months after your surgery. Your doctor will check to see that your nose is healing well. This care sheet gives you a general idea about how long it will take for you to recover. But each person recovers at a different pace. Follow the steps below to get better as quickly as possible. How can you care for yourself at home? Activity    Rest when you feel tired. Getting enough sleep will help you recover. Do not lie flat. Raise your head with two or three pillows. This can reduce swelling. Try to sleep on your back for the month after surgery. You can also sleep in a reclining chair. Try to walk each day. Start by walking a little more than you did the day before. Bit by bit, increase the amount you walk. Walking boosts blood flow and helps prevent pneumonia and constipation. Also, try to sit and stand as much as you can. For 1 week, try not to bend over or lift anything heavier than 10 pounds. This may include a child, heavy grocery bags and milk containers, a heavy briefcase or backpack, cat litter or dog food bags, or a vacuum . You can take a shower or bath. Avoid swimming for 6 weeks. Avoid strenuous activities, such as bicycle riding, jogging, weight lifting, or aerobic exercise, for 1 week or until your doctor says it is okay. You may drive when you are no longer taking prescription pain pills and feel up to it. Diet    You can eat your normal diet. If your stomach is upset, try bland, low-fat foods like plain rice, broiled chicken, toast, and yogurt. You may notice that your bowel movements are not regular right after your surgery.  This is common. Try to avoid constipation and straining with bowel movements. You may want to take a fiber supplement every day. If you have not had a bowel movement after a couple of days, ask your doctor about taking a mild laxative. Medicines    Your doctor will tell you if and when you can restart your medicines. You will also get instructions about taking any new medicines. If you stopped taking aspirin or some other blood thinner, your doctor will tell you when to start taking it again. Do not take aspirin, aspirin-containing medicines, or anti-inflammatory medicines such as ibuprofen (Advil, Motrin) or naproxen (Aleve) for 3 weeks following surgery unless your doctor says it is okay. Take pain medicines exactly as directed. If the doctor gave you a prescription medicine for pain, take it as prescribed. Do not take two or more pain medicines at the same time unless the doctor told you to. Many pain medicines have acetaminophen, which is Tylenol. Too much acetaminophen (Tylenol) can be harmful. If your doctor prescribed antibiotics, take them as directed. Do not stop taking them just because you feel better. You need to take the full course of antibiotics. If you think your pain medicine is making you sick to your stomach: Take your medicine after meals (unless your doctor has told you not to). Ask your doctor for a different pain medicine. Incision care    You will have a drip pad under your nose to collect blood. Change it only when it has bled through. You may have to do this every hour for 24 hours after surgery. When bleeding stops, you can remove it. If you have packing in your nose, leave it in. Your doctor will take it out. Ice and elevation    To help with swelling and pain, put ice or a cold pack on your nose for 10 to 20 minutes at a time. Put a thin cloth between the ice and your skin. Sleep with your head raised up. You can also sleep in a reclining chair.    Other instructions    Do not blow your nose for 1 week after surgery. Do not put anything into your nose. If you must sneeze, open your mouth and sneeze naturally. Use saline (saltwater) nasal washes to help keep your nasal passages open and wash out mucus and dried blood. You can buy saline nose sprays at a grocery store or drugstore. Follow the instructions on the package. Or you can make your own at home. Add 1 teaspoon of salt and 1 teaspoon of baking soda to 2 cups of distilled or boiled and cooled water. Fill a squeeze bottle with the nasal wash. Then put the tip into your nostril, and lean over the sink. With your mouth open, gently squirt the liquid. Repeat on the other side. You can wear your glasses when you wish. Do not wear contacts until the day after the surgery. Follow-up care is a key part of your treatment and safety. Be sure to make and go to all appointments, and call your doctor if you are having problems. It's also a good idea to know your test results and keep a list of the medicines you take. When should you call for help? Call 911 anytime you think you may need emergency care. For example, call if:    You passed out (lost consciousness). You have severe trouble breathing. Call your doctor now or seek immediate medical care if:    You have bleeding through the nasal packing that is not slowing. You have symptoms of infection, such as: Increased pain, swelling, warmth, or redness. Red streaks coming from the area. Pus draining from the area. A fever. You have pain that does not get better after you take pain pills. Watch closely for any changes in your health, and be sure to contact your doctor if:    You do not get better as expected. Where can you learn more? Go to http://www.woods.com/ and enter R944 to learn more about \"Nasal Septum Repair Surgery: What to Expect at Home. \"  Current as of:  May 4, 2022               Content Version: 13.5  © 0100-9710 [employer]. Care instructions adapted under license by [facility] ([facility]). If you have questions about a medical condition or this instruction, always ask your healthcare professional. [address] any warranty or liability for your use of this information.

## 2023-03-08 NOTE — OP NOTE
[facility]  OPER[facility]    Name:  Sarahy Leyva  MR#:   [id number]  :  1975  ACCOUNT #:  [de-identified]  DATE OF SERVICE:  2023    PREOPERATIVE DIAGNOSES:  1.  Nasal septal deflection. 2.  Inferior turbinate hypertrophy. 3.  Nasal congestion. 4.  Chronic right maxillary sinusitis. POSTOPERATIVE DIAGNOSES:  1.  Nasal septal deflection. 2.  Inferior turbinate hypertrophy. 3.  Nasal congestion. 4.  Chronic right maxillary sinusitis. PROCEDURE PERFORMED:  1.  Nasal septoplasty. 2.  Bilateral inferior turbinate submucosal resection. 3.  Endoscopic right maxillary antrostomy. SURGEON:  Yolis Winchester DO    ASSISTANT:  No surgical assistant. ANESTHESIA:  General with laryngeal mask airway. COMPLICATIONS:  None. SPECIMENS REMOVED:  Right sinus contents. IMPLANTS:  None. ESTIMATED BLOOD LOSS:  25 mL. DRAINS:  Bilateral King splints. INDICATIONS:  This is a 61-year-old female with the above diagnosis. She has failed medical therapy for the nasal congestion and the chronic recurrent sinusitis. The risks, benefits, and alternatives for operative intervention were discussed with the patient preoperatively. She stated understanding and desired to proceed. PROCEDURE:  The patient was brought into the operating room, placed supine on the operating table. After induction of general anesthetic, she was rotated 90 degrees. A surgical pause performed. Topical 4% cocaine was placed in nasal cavity and allowed to sit for several minutes. 1% Xylocaine with 1:200,000 epinephrine was then used to inject the bilateral mucoperichondrial flaps of the nasal septum. The patient was then prepped and draped in standard sinus surgery fashion. A #15 blade was used to incise a left hemitransfixion incision. A left mucoperichondrial flap was elevated and the bony cartilaginous junction was taken down with a Max elevator. A right posterior flap was elevated. Double-action scissors were used to incise the posterior septum. It was removed with Ormaria e Tellez forceps. Double-action rongeur was used to remove high posterior spurring. Attention was directed to the floor of the nose where the maxillary crest mucosa was dissected and a small resection of the maxillary crest was done with a double-action scissors. Reinspection of the nasal septum showed good results with the remaining portion of quadrangular cartilage sitting in the midline of the nasal groove with good nasal tip and midwall support. The hemitransfixion incision was closed with a 4.0 chromic gut suture and whipstitched in place with a 4.0 plain gut suture. The operative endoscope was brought in the field and the roots of the right middle turbinate was injected with the above anesthetic. A ball-tip probe was then used to identify the uncinate process. It was dissected with a Carter elevator and removed with a Blakesley forceps and a microdebrider. The right maxillary os was widened using a microdebrider. Inspection with the nasal endoscope showed no significant polypoid or inflammatory disease within the sinus today and this sinus was irrigated. There was no significant bleeding. Attention was then directed back to the inferior turbinates, which were injected with 1% Xylocaine with 1:200,000 epinephrine and then the submucosal resection blade on the Olympus microdebrider was brought to field and bilateral submucosal resection with the microdebrider was performed in the standard fashion. Approximately 15 seconds of bipolar on a setting of 25 was then performed at the end of the microdebriding. The remnants of the inferior turbinates were therapeutically fractured laterally laterally using a Boies elevator.   The nasal cavity was suctioned, irrigated, inspected for hemostasis and then a small amount resorbable packing was placed in the right middle meatus and then King splints dressed in bacitracin were placed in the nasal cavity and sewn in place with a 2.0 Prolene suture. Small amount of absorbable packing was placed along the mucosal edges of the turbinate mucosa. The patient was then given back to Anesthesia, awoke in the operative room without difficulty and transferred to PACU with stable vital signs.       [facility]      [id number]  D:  03/08/2023 11:11  T:  03/08/2023 18:51  JOB #:  [id number]

## 2023-03-09 ENCOUNTER — HOSPITAL ENCOUNTER (EMERGENCY)
Facility: HOSPITAL | Age: 48
Discharge: HOME | End: 2023-03-09
Attending: EMERGENCY MEDICINE
Payer: MEDICAID

## 2023-03-09 VITALS
WEIGHT: 159 LBS | OXYGEN SATURATION: 98 % | HEIGHT: 59 IN | SYSTOLIC BLOOD PRESSURE: 164 MMHG | TEMPERATURE: 98 F | HEART RATE: 88 BPM | RESPIRATION RATE: 18 BRPM | DIASTOLIC BLOOD PRESSURE: 95 MMHG | BODY MASS INDEX: 32.05 KG/M2

## 2023-03-09 DIAGNOSIS — R04.0 EPISTAXIS: Primary | ICD-10-CM

## 2023-03-09 PROCEDURE — 99282 EMERGENCY DEPT VISIT SF MDM: CPT

## 2023-03-09 ASSESSMENT — PAIN - FUNCTIONAL ASSESSMENT: PAIN_FUNCTIONAL_ASSESSMENT: 0-10

## 2023-03-09 ASSESSMENT — PAIN DESCRIPTION - LOCATION: LOCATION: NOSE

## 2023-03-09 ASSESSMENT — PAIN DESCRIPTION - DESCRIPTORS: DESCRIPTORS: ACHING;BURNING

## 2023-03-09 ASSESSMENT — PAIN SCALES - GENERAL: PAINLEVEL_OUTOF10: 8

## 2023-03-09 NOTE — ED PROVIDER NOTES
EMERGENCY DEPARTMENT HISTORY AND PHYSICAL EXAM      Date: 3/9/2023  Patient Name: Dana Gutierrez    History of Presenting Illness     Chief Complaint   Patient presents with    Epistaxis       History Provided By: Patient    HPI: Dana Gutierrez, 47 y.o. female with PMHx as noted below presents emergency department chief complaint epistaxis.  Patient noticed right-sided epistaxis this evening after a septoplasty she had done yesterday.  Patient states she attempted to use the Afrin at home however is still had a slow ooze so came to the ED for evaluation.  Denies any dizziness/lightheadedness or trauma to the area.        PCP: Cain Dias MD    No current facility-administered medications for this encounter.     Current Outpatient Medications   Medication Sig Dispense Refill    cyclobenzaprine (FLEXERIL) 5 MG tablet Cyclobenzaprine Oral  Oral 5.0 mg     active      hydroCHLOROthiazide (MICROZIDE) 12.5 MG capsule Take 12.5 mg by mouth daily (Patient not taking: Reported on 3/8/2023)      fluticasone-salmeterol (ADVAIR HFA) 230-21 MCG/ACT inhaler Inhale 2 puffs into the lungs 2 times daily      losartan (COZAAR) 25 MG tablet Take 25 mg by mouth daily      montelukast (SINGULAIR) 10 MG tablet Take 10 mg by mouth nightly      omeprazole (PRILOSEC) 10 MG delayed release capsule Take 10 mg by mouth daily      albuterol sulfate HFA (PROVENTIL;VENTOLIN;PROAIR) 108 (90 Base) MCG/ACT inhaler Inhale 2 puffs into the lungs every 4 hours as needed      busPIRone (BUSPAR) 5 MG tablet TAKE 1 TABLET BY MOUTH 3 TIMES DAILY.      celecoxib (CELEBREX) 200 MG capsule Take 200 mg by mouth daily      cyanocobalamin 1000 MCG tablet Take 1,000 mcg by mouth daily      fexofenadine (ALLEGRA) 180 MG tablet Take by mouth daily         Past History     Past Medical History:  Past Medical History:   Diagnosis Date    Allergies 1996    chronic    Anxiety and depression 2008    Asthma 1995    Breast cancer (HCC) 2021    Right breast- surgery,  no chemo or radiation tx    Carpal tunnel syndrome 2012    bilateral, had surgery    Chronic pain 2000    all over    GERD (gastroesophageal reflux disease) 2013    on meds    Hypertension 2005    IBS (irritable bowel syndrome) 2007    Kidney stones 2008    no surgery, left side    Lactose intolerance     Opitz-Guidry syndrome 2003    PAD (peripheral artery disease) ([address]     denies    Prediabetes 2019    not on meds, recent hga1c 6.3 on 1/24/2023    RA (rheumatoid arthritis) ([facility] 2000    Rheumatoid arthritis ([facility]     Sleep apnea 2020    uses CPAP    Stress incontinence     Urinary frequency 2013    Varicose vein 2006       Past Surgical History:  Past Surgical History:   Procedure Laterality Date    BREAST RECONSTRUCTION Right 04/2022    REPLACEMENT, TISSUE EXPANDER WITH PERMANENT PROSTHESIS    CARPAL TUNNEL RELEASE Right 2012    CARPAL TUNNEL RELEASE Left 03/2022    CATARACT REMOVAL Bilateral 2012    right in 2012, left in 2014    MASTECTOMY Right 12/15/2021    UROLOGICAL SURGERY  2006    Polpys removed       Family History:  No family history on file. Social History:  Social History     Tobacco Use    Smoking status: Never    Smokeless tobacco: Never   Substance Use Topics    Alcohol use: Yes     Alcohol/week: 1.0 standard drink     Comment: beer two to three times a week    Drug use: Never       Allergies:   Allergies   Allergen Reactions    Latex Hives and Itching    Coconut Fatty Acids Itching, Rash and Other (See Comments)     Other reaction(s): gi distress, mild rash/itching  GI distress    Other reaction(s): gi distress, mild rash/itching    Coconut Oil Other (See Comments)     Other reaction(s): gi distress, mild rash/itching  Other reaction(s): gi distress, mild rash/itching    Macadamia Nut Oil Other (See Comments)     States found out in allergy testing  States found out in allergy testing    Pecan Extract Allergy Skin Test Other (See Comments)     Other reaction(s): Unknown (comments)  Other reaction(s): unknown  Positive reaction to allergy skin testing  Positive reaction to allergy skin testing    Other reaction(s): unknown  Positive reaction to allergy skin testing  Other reaction(s): Unknown (comments)  Positive reaction to allergy skin testing         Review of Systems   Review of Systems  Positives and negatives in HPI. Physical Exam   Physical Exam    GENERAL: alert and oriented, no acute distress  EYES: PEERL, No injection, discharge or icterus. ENT: Mucous membranes pink and moist.  There is some mild swelling of the nose, right-sided nare with packing in place, mild ooze noted. NECK: Supple  LUNGS: Airway patent. Non-labored respirations. Breath sounds clear with good air entry bilaterally. HEART: Regular rate and rhythm. No peripheral edema  NEUROLOGICAL: Alert, oriented      Diagnostic Study Results     Labs -   No results found for this or any previous visit (from the past 12 hour(s)). Radiologic Studies -   No orders to display           Medical Decision Making       I reviewed the vital signs, available nursing notes, past medical history, past surgical history, family history and social history. Vital Signs-Reviewed the patient's vital signs. Patient Vitals for the past 12 hrs:   Temp Pulse Resp BP SpO2   03/09/23 0145 98 °F (36.7 °C) 88 18 (!) 164/95 98 %       Provider Notes (Medical Decision Making): On presentation, the patient is well appearing, in no acute distress with reassuring vital signs. Based on my history and exam the differential diagnosis for this patient includes postoperative bleeding, blood loss anemia. Patient having no symptoms or vital signs suggestive of significant blood loss to warrant labs. Gentle pressure was applied to the area with resolution of bleeding. I did consult with  the on-call ENT Dr. Evan Otero and he recommended patient be seen this morning in Dr. Singh Parent office.   Patient will call in the morning and go to the office for further evaluation. Will return to the ED if the bleeding resumes. ED Course:   Initial assessment performed. The patients presenting problems have been discussed, and they are in agreement with the care plan formulated and outlined with them. I have encouraged them to ask questions as they arise throughout their visit. Patient was given the following medications:  Medications - No data to display           CONSULTS: (Who and What was discussed)  IP CONSULT TO [address] [place] [address] St. JULIANNA Gutierrez's  results have been reviewed with her. She has been counseled regarding her diagnosis. She verbally conveys understanding and agreement of the signs, symptoms, diagnosis, treatment and prognosis and additionally agrees to follow up as recommended. .  She also agrees with the care-plan and conveys that all of her questions have been answered. I have also put together some discharge instructions for her that include: 1) educational information regarding their diagnosis, 2) how to care for their diagnosis at home, as well a 3) list of reasons why they would want to return to the ED prior to their follow-up appointment, should their condition change.       Disposition:  home    PLAN:  1. DISCHARGE MEDICATIONS:  Discharge Medication List as of 3/9/2023  3:20 AM             Details   cyclobenzaprine (FLEXERIL) 5 MG tablet Cyclobenzaprine Oral  Oral 5.0 mg     activeHistorical Med      hydroCHLOROthiazide (MICROZIDE) 12.5 MG capsule Take 12.5 mg by mouth dailyHistorical Med      fluticasone-salmeterol (ADVAIR HFA) 230-21 MCG/ACT inhaler Inhale 2 puffs into the lungs 2 times dailyHistorical Med      losartan (COZAAR) 25 MG tablet Take 25 mg by mouth dailyHistorical Med      montelukast (SINGULAIR) 10 MG tablet Take 10 mg by mouth nightlyHistorical Med      omeprazole (PRILOSEC) 10 MG delayed release capsule Take 10 mg by mouth dailyHistorical Med      albuterol sulfate HFA (PROVENTIL;VENTOLIN;PROAIR) 108 (90 Base) MCG/ACT inhaler Inhale 2 puffs into the lungs every 4 hours as neededHistorical Med      busPIRone (BUSPAR) 5 MG tablet TAKE 1 TABLET BY MOUTH 3 TIMES DAILY. Historical Med      celecoxib (CELEBREX) 200 MG capsule Take 200 mg by mouth dailyHistorical Med      cyanocobalamin 1000 MCG tablet Take 1,000 mcg by mouth dailyHistorical Med      fexofenadine (ALLEGRA) 180 MG tablet Take by mouth dailyHistorical Med             DISCONTINUED MEDICATIONS:  Discharge Medication List as of 3/9/2023  3:20 AM          PATIENT REFERRED TO:  Follow Up with:  [facility] EMERGENCY DEPT  [address] [place] [id number] [phone]    If symptoms worsen    Follow-up with ENT in the office in the morning after they open. Return to ED if worse     Diagnosis     Clinical Impression:   1. Epistaxis          I, Migel Morales MD am the first provider for this patient and am the attending of record for this patient encounter. Migel Morales MD        Please note that this dictation was completed with Dragon, computer voice recognition software. Quite often unanticipated grammatical, syntax, homophones, and other interpretive errors are inadvertently transcribed by the computer software. Please disregard these errors. Additionally, please excuse any errors that have escaped final proofreading.           Kianna Shipley MD  03/09/23 [id number]

## 2023-03-09 NOTE — ED TRIAGE NOTES
Pt CC of:nose bleed   Time of onset:1 hr ago   Activity of onset:moving around   Description of pain:burning   Treatment PTA:nasal spray   Associated sx: denies N/V/D/C      Pt ambulated independentlySpeaking in full sentences clearly  A&Ox4  Respirations even and unlabored  Pt behavior: Sitting comfortably on chair     Surgery for septum repair @0730 yesterday.

## 2023-07-03 RX ORDER — CITALOPRAM 20 MG/1
20 TABLET ORAL DAILY
COMMUNITY

## 2023-07-05 ENCOUNTER — ANESTHESIA EVENT (OUTPATIENT)
Facility: HOSPITAL | Age: 48
End: 2023-07-05
Payer: MEDICAID

## 2023-07-05 ENCOUNTER — ANESTHESIA (OUTPATIENT)
Facility: HOSPITAL | Age: 48
End: 2023-07-05
Payer: MEDICAID

## 2023-07-05 ENCOUNTER — HOSPITAL ENCOUNTER (OUTPATIENT)
Facility: HOSPITAL | Age: 48
Setting detail: OUTPATIENT SURGERY
Discharge: HOME | End: 2023-07-05
Attending: INTERNAL MEDICINE | Admitting: INTERNAL MEDICINE
Payer: MEDICAID

## 2023-07-05 VITALS
WEIGHT: 159 LBS | RESPIRATION RATE: 16 BRPM | BODY MASS INDEX: 36.8 KG/M2 | HEART RATE: 94 BPM | OXYGEN SATURATION: 98 % | HEIGHT: 55 IN | DIASTOLIC BLOOD PRESSURE: 94 MMHG | SYSTOLIC BLOOD PRESSURE: 120 MMHG | TEMPERATURE: 98 F

## 2023-07-05 PROCEDURE — 3600007512: Performed by: INTERNAL MEDICINE

## 2023-07-05 PROCEDURE — 7100000010 HC PHASE II RECOVERY - FIRST 15 MIN: Performed by: INTERNAL MEDICINE

## 2023-07-05 PROCEDURE — 2580000003 HC RX 258: Performed by: INTERNAL MEDICINE

## 2023-07-05 PROCEDURE — 7100000011 HC PHASE II RECOVERY - ADDTL 15 MIN: Performed by: INTERNAL MEDICINE

## 2023-07-05 PROCEDURE — 6360000002 HC RX W HCPCS: Performed by: NURSE ANESTHETIST, CERTIFIED REGISTERED

## 2023-07-05 PROCEDURE — 88305 TISSUE EXAM BY PATHOLOGIST: CPT

## 2023-07-05 PROCEDURE — 6370000000 HC RX 637 (ALT 250 FOR IP): Performed by: INTERNAL MEDICINE

## 2023-07-05 PROCEDURE — 2500000003 HC RX 250 WO HCPCS: Performed by: NURSE ANESTHETIST, CERTIFIED REGISTERED

## 2023-07-05 PROCEDURE — 2709999900 HC NON-CHARGEABLE SUPPLY: Performed by: INTERNAL MEDICINE

## 2023-07-05 PROCEDURE — 3600007502: Performed by: INTERNAL MEDICINE

## 2023-07-05 PROCEDURE — 3700000000 HC ANESTHESIA ATTENDED CARE: Performed by: INTERNAL MEDICINE

## 2023-07-05 PROCEDURE — 3700000001 HC ADD 15 MINUTES (ANESTHESIA): Performed by: INTERNAL MEDICINE

## 2023-07-05 RX ORDER — SODIUM CHLORIDE 0.9 % (FLUSH) 0.9 %
5-40 SYRINGE (ML) INJECTION EVERY 12 HOURS SCHEDULED
Status: DISCONTINUED | OUTPATIENT
Start: 2023-07-05 | End: 2023-07-05 | Stop reason: HOSPADM

## 2023-07-05 RX ORDER — SODIUM CHLORIDE 0.9 % (FLUSH) 0.9 %
5-40 SYRINGE (ML) INJECTION PRN
Status: DISCONTINUED | OUTPATIENT
Start: 2023-07-05 | End: 2023-07-05 | Stop reason: HOSPADM

## 2023-07-05 RX ORDER — SIMETHICONE 20 MG/.3ML
40 EMULSION ORAL EVERY 6 HOURS PRN
Status: DISCONTINUED | OUTPATIENT
Start: 2023-07-05 | End: 2023-07-05 | Stop reason: HOSPADM

## 2023-07-05 RX ORDER — SODIUM CHLORIDE 9 MG/ML
25 INJECTION, SOLUTION INTRAVENOUS PRN
Status: DISCONTINUED | OUTPATIENT
Start: 2023-07-05 | End: 2023-07-05 | Stop reason: HOSPADM

## 2023-07-05 RX ORDER — SODIUM CHLORIDE 9 MG/ML
INJECTION, SOLUTION INTRAVENOUS CONTINUOUS
Status: DISCONTINUED | OUTPATIENT
Start: 2023-07-05 | End: 2023-07-05 | Stop reason: HOSPADM

## 2023-07-05 RX ADMIN — PROPOFOL 50 MG: 10 INJECTION, EMULSION INTRAVENOUS at 09:26

## 2023-07-05 RX ADMIN — PROPOFOL 50 MG: 10 INJECTION, EMULSION INTRAVENOUS at 09:32

## 2023-07-05 RX ADMIN — LIDOCAINE HYDROCHLORIDE 100 MG: 20 INJECTION, SOLUTION EPIDURAL; INFILTRATION; INTRACAUDAL; PERINEURAL at 09:15

## 2023-07-05 RX ADMIN — PROPOFOL 20 MG: 10 INJECTION, EMULSION INTRAVENOUS at 09:38

## 2023-07-05 RX ADMIN — SIMETHICONE 40 MG: 20 SUSPENSION/ DROPS ORAL at 09:33

## 2023-07-05 RX ADMIN — SODIUM CHLORIDE: 9 INJECTION, SOLUTION INTRAVENOUS at 08:40

## 2023-07-05 RX ADMIN — PROPOFOL 100 MG: 10 INJECTION, EMULSION INTRAVENOUS at 09:17

## 2023-07-05 RX ADMIN — PROPOFOL 100 MG: 10 INJECTION, EMULSION INTRAVENOUS at 09:15

## 2023-07-05 ASSESSMENT — PAIN - FUNCTIONAL ASSESSMENT: PAIN_FUNCTIONAL_ASSESSMENT: NONE - DENIES PAIN

## 2023-07-05 NOTE — PERIOP NOTE
ARRIVAL INFORMATION:  Verified patient name and date of birth, scheduled procedure, and informed consent. Montrell Hernandez is responsible party. Physician and staff cannot share information with the . Belongings with patient include:  Clothing,Glasses        GI FOCUSED ASSESSMENT:  Neuro: Awake, alert, oriented x4  Respiratory: even and unlabored   GI: soft and non-distended  EKG Rhythm: normal sinus rhythm    Education:Reviewed general discharge instructions and  information.

## 2023-07-05 NOTE — PROGRESS NOTES
Endoscopy Case End Note:    7415:  Procedure scope (EGD) was pre-cleaned, per protocol, at bedside by MERCEDES Salazar      9357:  Procedure scope (colon) was pre-cleaned, per protocol, at bedside by MERCEDES Salazar     0945:  Report received from anesthesia - Renetta Mendez CRNA. See anesthesia flowsheet for intra-procedure vital signs and events.

## 2023-07-05 NOTE — ANESTHESIA POSTPROCEDURE EVALUATION
Department of Anesthesiology  Postprocedure Note    Patient: Kitty Nation  MRN: [id number]  YOB: 1975  Date of evaluation: 7/5/2023      Procedure Summary     Date: 07/05/23 Room / [facility] / [facility] END[facility]    Anesthesia Start: 0911 Anesthesia Stop: 4142    Procedures:       COLONOSCOPY/EGD (Lower GI Region)      EGD BIOPSY (Upper GI Region) Diagnosis:       Hx of colonic polyps      Hoarseness      (Hx of colonic polyps [Z86.010])      (Hoarseness [R49.0])    Surgeons: Mitzi Broderick MD Responsible Provider: Kranthi Castellano MD    Anesthesia Type: MAC ASA Status: 2          Anesthesia Type: No value filed.     Ivan Phase I: Ivan Score: 10    Ivan Phase II: Ivan Score: 10      Anesthesia Post Evaluation    Patient location during evaluation: PACU  Patient participation: complete - patient participated  Level of consciousness: awake and alert  Airway patency: patent  Nausea & Vomiting: no nausea and no vomiting  Complications: no  Cardiovascular status: hemodynamically stable  Respiratory status: acceptable  Hydration status: stable

## 2023-07-05 NOTE — ANESTHESIA PRE PROCEDURE
Department of Anesthesiology  Preprocedure Note       Name:  Yuriy Le   Age:  56 y.o.  :  1975                                          MRN:  [id number]         Date:  2023      Surgeon: Fela Crews):  Lauern Landers MD    Procedure: Procedure(s):  COLONOSCOPY/EGD  EGD BIOPSY    Medications prior to admission:   Prior to Admission medications    Medication Sig Start Date End Date Taking?  Authorizing Provider   EPINEPHrine (SYMJEPI) 0.3 MG/0.3ML SOSY injection Inject 0.3 mLs into the muscle as needed    Historical Provider, MD   citalopram (CELEXA) 20 MG tablet Take 1 tablet by mouth daily    Historical Provider, MD   cyclobenzaprine (FLEXERIL) 5 MG tablet daily as needed    Historical Provider, MD   hydroCHLOROthiazide (MICROZIDE) 12.5 MG capsule Take 12.5 mg by mouth daily  Patient not taking: Reported on 3/8/2023    Historical Provider, MD   fluticasone-salmeterol (ADVAIR HFA) 230-21 MCG/ACT inhaler Inhale 2 puffs into the lungs 2 times daily    Historical Provider, MD   losartan (COZAAR) 25 MG tablet Take 1 tablet by mouth daily    Ar Automatic Reconciliation   montelukast (SINGULAIR) 10 MG tablet Take 10 mg by mouth nightly    Ar Automatic Reconciliation   omeprazole (PRILOSEC) 10 MG delayed release capsule Take 1 capsule by mouth daily    Ar Automatic Reconciliation   albuterol sulfate HFA (PROVENTIL;VENTOLIN;PROAIR) 108 (90 Base) MCG/ACT inhaler Inhale 2 puffs into the lungs every 4 hours as needed 21   Ar Automatic Reconciliation   busPIRone (BUSPAR) 5 MG tablet TAKE 1 TABLET BY MOUTH 3 TIMES DAILY. 18   Ar Automatic Reconciliation   celecoxib (CELEBREX) 200 MG capsule Take 200 mg by mouth daily    Ar Automatic Reconciliation   cyanocobalamin 1000 MCG tablet Take 1,000 mcg by mouth daily  Patient not taking: Reported on 7/3/2023    Ar Automatic Reconciliation   fexofenadine (ALLEGRA) 180 MG tablet Take by mouth daily    Ar Automatic Reconciliation       Current medications:    No current

## 2023-07-05 NOTE — DISCHARGE INSTRUCTIONS
Roderick Book  [id number]  1975    EGD and COLONOSCOPY DISCHARGE INSTRUCTIONS  Discomfort:  Sore throat- throat lozenges or warm salt water gargle  redness at IV site- apply warm compress to area; if redness or soreness persist- contact your physician  Gaseous discomfort- walking, belching will help relieve any discomfort  You may not operate a vehicle for 12 hours  You may not engage in an occupation involving machinery or appliances for rest of today  You may not drink alcoholic beverages for at least 12 hours  Avoid making any critical decisions for at least 24 hour  DIET  You may have minimal sips at this time-- do not eat or drink for two hours. You may eat and drink after you wake up  You may resume your regular diet - however -  remember your colon is empty and a heavy meal will produce gas. Avoid these foods:  vegetables, fried / greasy foods, carbonated drinks    MEDICATIONS:  (See attached)    ACTIVITY  You may resume your normal daily activities until tomorrow AM;  Spend the remainder of the day resting -  avoid any strenuous activity. CALL M.D. ANY SIGN OF   Increasing pain, nausea, vomiting  Abdominal distension (swelling)  New increased bleeding (oral or rectal)  Fever (chills)  Pain in chest area  Bloody discharge from nose or mouth  Shortness of breath    IMPRESSION:    EGD:  -- mild \"gastritis\" or stomach redness and irritation present. Sometimes this is from bacteria, medications (like NSAIDs), or bile, or something else. We biopsied this today. -- esophagus was normal appearing, we did biopsies to look for inflammation or any signs of reflux issues     COLONOSCOPY:  -- THREE total colon polyps, all quite small, and removed today without much trouble  -- we saw some hemorrhoids, which are very common, and these are swollen veins at the anal canal or end of the rectum, which can bulge with constipation and straining or frequent bowel movements.  Sometimes they cause bleeding, burning,

## 2023-07-05 NOTE — OP NOTE
NAME:  Jenni Cali   :   1975   MRN:   [id number]     Date/Time:  2023 9:26 AM    Colonoscopy Operative Report    Procedure Type:  Colonoscopy with polypectomy (cold snare)     Indications:  CRC screening  Pre-operative Diagnosis: see indication above  Post-operative Diagnosis:  See findings below  :  Sterling Glover MD  Referring Provider: -Sae Gan MD    Exam:  Airway: clear, no airway problems anticipated  Heart: RRR, without gallops or rubs  Lungs: clear bilaterally without wheezes, crackles, or rhonchi  Abdomen: soft, nontender, nondistended, bowel sounds present  Mental Status: awake, alert and oriented to person, place and time    Sedation:  MAC anesthesia Propofol  Procedure Details:  After informed consent was obtained with all risks and benefits of procedure explained and preoperative exam completed, the patient was taken to the endoscopy suite and placed in the left lateral decubitus position. Upon sequential sedation as per above, a digital rectal exam was performed demonstrating internal and external hemorrhoids. The Olympus videocolonoscope  was inserted in the rectum and carefully advanced to the cecum, which was identified by the ileocecal valve and appendiceal orifice. The quality of preparation was good. The colonoscope was slowly withdrawn with careful evaluation between folds. Retroflexion in the rectum was completed demonstrating internal and external hemorrhoids. Findings:   ANUS: Anal exam reveals no masses but hemorrhoids, sphincter tone is normal.   RECTUM: Rectal exam reveals no masses but hemorrhoids. SIGMOID COLON:   -- two sessile polyps, sized 0.3 cm and 0.4 cm, removed with cold snare  -- otherwise, normal.  DESCENDING COLON: The mucosa is normal with good vascular pattern and without ulcers, diverticula, and polyps.    TRANSVERSE COLON:   -- sessile polyp, sized 0.5 cm, removed with cold snare  -- otherwise, normal.  ASCENDING COLON: The mucosa

## 2023-07-05 NOTE — OP NOTE
NAME:  Az Goff   :   1975   MRN:   [id number]     Date/Time:  2023 9:13 AM    Esophagogastroduodenoscopy (EGD) Procedure Note    Procedure: Esophagogastroduodenoscopy with biopsy    Indication: hoarseness  Pre-operative Diagnosis: see indication above  Post-operative Diagnosis: see findings below  :  Naomi Simon MD  Referring Provider:   Padma Pacheco MD    Exam:  Airway: clear, no airway problems anticipated  Heart: RRR, without gallops or rubs  Lungs: clear bilaterally without wheezes, crackles, or rhonchi  Abdomen: soft, nontender, nondistended, bowel sounds present  Mental Status: awake, alert and oriented to person, place and time     Anethesia/Sedation:  MAC anesthesia Propofol  Procedure Details   After informed consent was obtained for the procedure, with all risks and benefits of procedure explained the patient was taken to the endoscopy suite and placed in the left lateral decubitus position. Following sequential administration of sedation as per above, the [id number] gastroscope was inserted into the mouth and advanced under direct vision to third portion of the duodenum. A careful inspection was made as the gastroscope was withdrawn, including a retroflexed view of the proximal stomach; findings and interventions are described below. Findings:   OROPHARYNX: Cords and pyriform recesses normal.   ESOPHAGUS: The esophagus is normal. The proximal, mid, and distal portions are normal. The Z-Line is intact. Distal and proximal biopsies obtained to evaluate for eosinophilia. STOMACH:   -- The fundus on antegrade and retroflex views is normal.   -- diffuse erythema, biopsied to rule out gastritis  -- polyp along gastric body, biopsied.   DUODENUM: The bulb, second and third portions are normal.    Therapies:   biopsy of esophagus  biopsy of stomach body, antrum, polyp    Specimens: gastric, gastric polyp, distal esophagus, proximal esophagus    EBL:  minimal.

## 2023-07-05 NOTE — H&P
Gastroenterology Outpatient History and Physical    Patient: Km Starks    Physician: Geovanna Whelan MD    Chief Complaint: hoarseness, CRC screening  History of Present Illness: 55 yo F with hoarseness, and needs CRC screening. Previous EGD with Schatzki's ring.     History:  Past Medical History:   Diagnosis Date    Allergies 1996    chronic    Anxiety and depression 2008    Asthma 1995    Breast cancer ([address]) 2021    Right breast- surgery, no chemo or radiation tx    Carpal tunnel syndrome 2012    bilateral, had surgery    Chronic pain 2000    all over    GERD (gastroesophageal reflux disease) 2013    on meds    Hypertension 2005    IBS (irritable bowel syndrome) 2007    Kidney stones 2008    no surgery, left side    Lactose intolerance     Opitz-Guidry syndrome 2003    PAD (peripheral artery disease) ([address])     denies    Prediabetes 2019    not on meds, recent hga1c 6.3 on 1/24/2023    RA (rheumatoid arthritis) ([address]) 2000    Rheumatoid arthritis ([address])     Sleep apnea 2020    uses CPAP    Stress incontinence     Urinary frequency 2013    Varicose vein 2006      Past Surgical History:   Procedure Laterality Date    BREAST RECONSTRUCTION Right 04/2022    REPLACEMENT, TISSUE EXPANDER WITH PERMANENT PROSTHESIS    CARPAL TUNNEL RELEASE Right 2012    CARPAL TUNNEL RELEASE Left 03/2022    CATARACT REMOVAL Bilateral 2012    right in 2012, left in 2014    MASTECTOMY Right 12/15/2021    SEPTOPLASTY N/A 03/08/2023    SEPTOPLASTY, SUBMUCOSAL RESECTION OF TURBINATES, MAXILLARY ANTROSTOMY (SPEC POP) performed by Brenden Orellana DO at [facility]  2006    Polpys removed      Social History     Socioeconomic History    Marital status: Single     Spouse name: None    Number of children: None    Years of education: None    Highest education level: None   Tobacco Use    Smoking status: Never    Smokeless tobacco: Never   Vaping Use    Vaping Use: Never used   Substance and Sexual Activity

## 2023-11-17 ENCOUNTER — HOSPITAL ENCOUNTER (OUTPATIENT)
Facility: HOSPITAL | Age: 48
End: 2023-11-17
Payer: MEDICAID

## 2023-11-17 DIAGNOSIS — K29.70 IRRITANT GASTRITIS: ICD-10-CM

## 2023-11-17 DIAGNOSIS — R10.816 EPIGASTRIC ABDOMINAL TENDERNESS, REBOUND TENDERNESS PRESENCE NOT SPECIFIED: ICD-10-CM

## 2023-11-17 LAB — CREAT UR-MCNC: 0.7 MG/DL (ref 0.6–1.3)

## 2023-11-17 PROCEDURE — 6360000004 HC RX CONTRAST MEDICATION: Performed by: INTERNAL MEDICINE

## 2023-11-17 PROCEDURE — 82565 ASSAY OF CREATININE: CPT

## 2023-11-17 PROCEDURE — 74160 CT ABDOMEN W/CONTRAST: CPT

## 2023-11-17 RX ADMIN — IOPAMIDOL 80 ML: 612 INJECTION, SOLUTION INTRAVENOUS at 08:11

## 2024-07-05 ENCOUNTER — APPOINTMENT (OUTPATIENT)
Facility: HOSPITAL | Age: 49
End: 2024-07-05
Payer: MEDICAID

## 2024-07-05 ENCOUNTER — HOSPITAL ENCOUNTER (EMERGENCY)
Facility: HOSPITAL | Age: 49
Discharge: HOME | End: 2024-07-05
Payer: MEDICAID

## 2024-07-05 VITALS
OXYGEN SATURATION: 98 % | DIASTOLIC BLOOD PRESSURE: 93 MMHG | WEIGHT: 152 LBS | BODY MASS INDEX: 35.18 KG/M2 | RESPIRATION RATE: 29 BRPM | HEIGHT: 55 IN | SYSTOLIC BLOOD PRESSURE: 158 MMHG | TEMPERATURE: 98 F | HEART RATE: 100 BPM

## 2024-07-05 DIAGNOSIS — R07.89 ATYPICAL CHEST PAIN: Primary | ICD-10-CM

## 2024-07-05 DIAGNOSIS — F41.1 ANXIETY STATE: ICD-10-CM

## 2024-07-05 LAB
ALBUMIN SERPL-MCNC: 3.7 G/DL (ref 3.4–5)
ALBUMIN/GLOB SERPL: 0.9 (ref 0.8–1.7)
ALP SERPL-CCNC: 113 U/L (ref 45–117)
ALT SERPL-CCNC: 23 U/L (ref 13–56)
ANION GAP SERPL CALC-SCNC: 6 MMOL/L (ref 3–18)
APPEARANCE UR: CLEAR
AST SERPL-CCNC: 19 U/L (ref 10–38)
BACTERIA URNS QL MICRO: ABNORMAL /HPF
BASOPHILS # BLD: 0 K/UL (ref 0–0.1)
BASOPHILS NFR BLD: 1 % (ref 0–2)
BILIRUB SERPL-MCNC: 0.3 MG/DL (ref 0.2–1)
BILIRUB UR QL: NEGATIVE
BUN SERPL-MCNC: 13 MG/DL (ref 7–18)
BUN/CREAT SERPL: 15 (ref 12–20)
CALCIUM SERPL-MCNC: 8.9 MG/DL (ref 8.5–10.1)
CHLORIDE SERPL-SCNC: 106 MMOL/L (ref 100–111)
CK SERPL-CCNC: 169 U/L (ref 26–192)
CO2 SERPL-SCNC: 26 MMOL/L (ref 21–32)
COLOR UR: YELLOW
CREAT SERPL-MCNC: 0.89 MG/DL (ref 0.6–1.3)
D DIMER PPP FEU-MCNC: 0.44 UG/ML(FEU)
DIFFERENTIAL METHOD BLD: ABNORMAL
EKG ATRIAL RATE: 110 BPM
EKG DIAGNOSIS: NORMAL
EKG P AXIS: 66 DEGREES
EKG P-R INTERVAL: 160 MS
EKG Q-T INTERVAL: 334 MS
EKG QRS DURATION: 72 MS
EKG QTC CALCULATION (BAZETT): 452 MS
EKG R AXIS: -8 DEGREES
EKG T AXIS: 42 DEGREES
EKG VENTRICULAR RATE: 110 BPM
EOSINOPHIL # BLD: 0 K/UL (ref 0–0.4)
EOSINOPHIL NFR BLD: 0 % (ref 0–5)
EPITH CASTS URNS QL MICRO: ABNORMAL /LPF (ref 0–5)
ERYTHROCYTE [DISTWIDTH] IN BLOOD BY AUTOMATED COUNT: 15.3 % (ref 11.6–14.5)
GLOBULIN SER CALC-MCNC: 4.1 G/DL (ref 2–4)
GLUCOSE SERPL-MCNC: 151 MG/DL (ref 74–99)
GLUCOSE UR STRIP.AUTO-MCNC: 250 MG/DL
HCT VFR BLD AUTO: 42.2 % (ref 35–45)
HGB BLD-MCNC: 13.7 G/DL (ref 12–16)
HGB UR QL STRIP: NEGATIVE
IMM GRANULOCYTES # BLD AUTO: 0 K/UL (ref 0–0.04)
IMM GRANULOCYTES NFR BLD AUTO: 0 % (ref 0–0.5)
KETONES UR QL STRIP.AUTO: NEGATIVE MG/DL
LEUKOCYTE ESTERASE UR QL STRIP.AUTO: ABNORMAL
LIPASE SERPL-CCNC: 35 U/L (ref 13–75)
LYMPHOCYTES # BLD: 3 K/UL (ref 0.9–3.6)
LYMPHOCYTES NFR BLD: 41 % (ref 21–52)
MAGNESIUM SERPL-MCNC: 1.9 MG/DL (ref 1.6–2.6)
MCH RBC QN AUTO: 29.4 PG (ref 24–34)
MCHC RBC AUTO-ENTMCNC: 32.5 G/DL (ref 31–37)
MCV RBC AUTO: 90.6 FL (ref 78–100)
MONOCYTES # BLD: 0.6 K/UL (ref 0.05–1.2)
MONOCYTES NFR BLD: 8 % (ref 3–10)
MUCOUS THREADS URNS QL MICRO: ABNORMAL /LPF
NEUTS SEG # BLD: 3.8 K/UL (ref 1.8–8)
NEUTS SEG NFR BLD: 50 % (ref 40–73)
NITRITE UR QL STRIP.AUTO: NEGATIVE
NRBC # BLD: 0 K/UL (ref 0–0.01)
NRBC BLD-RTO: 0 PER 100 WBC
NT PRO BNP: <5 PG/ML (ref 0–450)
PH UR STRIP: 6 (ref 5–8)
PLATELET # BLD AUTO: 269 K/UL (ref 135–420)
PMV BLD AUTO: 9 FL (ref 9.2–11.8)
POTASSIUM SERPL-SCNC: 3.6 MMOL/L (ref 3.5–5.5)
PROT SERPL-MCNC: 7.8 G/DL (ref 6.4–8.2)
PROT UR STRIP-MCNC: NEGATIVE MG/DL
RBC # BLD AUTO: 4.66 M/UL (ref 4.2–5.3)
RBC #/AREA URNS HPF: ABNORMAL /HPF (ref 0–5)
SODIUM SERPL-SCNC: 138 MMOL/L (ref 136–145)
SP GR UR REFRACTOMETRY: 1.02 (ref 1–1.03)
TROPONIN I SERPL HS-MCNC: 6 NG/L (ref 0–54)
TROPONIN I SERPL HS-MCNC: 7 NG/L (ref 0–54)
TSH SERPL DL<=0.05 MIU/L-ACNC: 1.64 UIU/ML (ref 0.36–3.74)
UROBILINOGEN UR QL STRIP.AUTO: 0.2 EU/DL (ref 0.2–1)
WBC # BLD AUTO: 7.5 K/UL (ref 4.6–13.2)
WBC URNS QL MICRO: ABNORMAL /HPF (ref 0–5)
YEAST URNS QL MICRO: ABNORMAL

## 2024-07-05 PROCEDURE — 6360000002 HC RX W HCPCS: Performed by: PHYSICIAN ASSISTANT

## 2024-07-05 PROCEDURE — 93005 ELECTROCARDIOGRAM TRACING: CPT | Performed by: EMERGENCY MEDICINE

## 2024-07-05 PROCEDURE — 83690 ASSAY OF LIPASE: CPT

## 2024-07-05 PROCEDURE — 82550 ASSAY OF CK (CPK): CPT

## 2024-07-05 PROCEDURE — 85379 FIBRIN DEGRADATION QUANT: CPT

## 2024-07-05 PROCEDURE — 81001 URINALYSIS AUTO W/SCOPE: CPT

## 2024-07-05 PROCEDURE — 6370000000 HC RX 637 (ALT 250 FOR IP): Performed by: PHYSICIAN ASSISTANT

## 2024-07-05 PROCEDURE — 83735 ASSAY OF MAGNESIUM: CPT

## 2024-07-05 PROCEDURE — 71045 X-RAY EXAM CHEST 1 VIEW: CPT

## 2024-07-05 PROCEDURE — 85025 COMPLETE CBC W/AUTO DIFF WBC: CPT

## 2024-07-05 PROCEDURE — 93005 ELECTROCARDIOGRAM TRACING: CPT | Performed by: PHYSICIAN ASSISTANT

## 2024-07-05 PROCEDURE — 99285 EMERGENCY DEPT VISIT HI MDM: CPT

## 2024-07-05 PROCEDURE — 83880 ASSAY OF NATRIURETIC PEPTIDE: CPT

## 2024-07-05 PROCEDURE — 84484 ASSAY OF TROPONIN QUANT: CPT

## 2024-07-05 PROCEDURE — 2580000003 HC RX 258: Performed by: PHYSICIAN ASSISTANT

## 2024-07-05 PROCEDURE — 80053 COMPREHEN METABOLIC PANEL: CPT

## 2024-07-05 PROCEDURE — 93010 ELECTROCARDIOGRAM REPORT: CPT | Performed by: INTERNAL MEDICINE

## 2024-07-05 PROCEDURE — 96375 TX/PRO/DX INJ NEW DRUG ADDON: CPT

## 2024-07-05 PROCEDURE — 96374 THER/PROPH/DIAG INJ IV PUSH: CPT

## 2024-07-05 PROCEDURE — 87086 URINE CULTURE/COLONY COUNT: CPT

## 2024-07-05 PROCEDURE — 84443 ASSAY THYROID STIM HORMONE: CPT

## 2024-07-05 RX ORDER — DEXAMETHASONE SODIUM PHOSPHATE 10 MG/ML
6 INJECTION, SOLUTION INTRAMUSCULAR; INTRAVENOUS
Status: COMPLETED | OUTPATIENT
Start: 2024-07-05 | End: 2024-07-05

## 2024-07-05 RX ORDER — IPRATROPIUM BROMIDE AND ALBUTEROL SULFATE 2.5; .5 MG/3ML; MG/3ML
1 SOLUTION RESPIRATORY (INHALATION)
Status: COMPLETED | OUTPATIENT
Start: 2024-07-05 | End: 2024-07-05

## 2024-07-05 RX ORDER — INHALER, ASSIST DEVICES
SPACER (EA) MISCELLANEOUS
Qty: 1 EACH | Refills: 0 | Status: SHIPPED | OUTPATIENT
Start: 2024-07-05

## 2024-07-05 RX ORDER — SODIUM CHLORIDE, SODIUM LACTATE, POTASSIUM CHLORIDE, AND CALCIUM CHLORIDE .6; .31; .03; .02 G/100ML; G/100ML; G/100ML; G/100ML
1000 INJECTION, SOLUTION INTRAVENOUS
Status: COMPLETED | OUTPATIENT
Start: 2024-07-05 | End: 2024-07-05

## 2024-07-05 RX ORDER — ACETAMINOPHEN 500 MG
1000 TABLET ORAL
Status: COMPLETED | OUTPATIENT
Start: 2024-07-05 | End: 2024-07-05

## 2024-07-05 RX ORDER — ONDANSETRON 2 MG/ML
4 INJECTION INTRAMUSCULAR; INTRAVENOUS
Status: COMPLETED | OUTPATIENT
Start: 2024-07-05 | End: 2024-07-05

## 2024-07-05 RX ORDER — LORAZEPAM 1 MG/1
1 TABLET ORAL
Status: COMPLETED | OUTPATIENT
Start: 2024-07-05 | End: 2024-07-05

## 2024-07-05 RX ORDER — ALBUTEROL SULFATE 90 UG/1
2 AEROSOL, METERED RESPIRATORY (INHALATION) EVERY 6 HOURS PRN
Qty: 18 G | Refills: 0 | Status: SHIPPED | OUTPATIENT
Start: 2024-07-05

## 2024-07-05 RX ADMIN — DEXAMETHASONE SODIUM PHOSPHATE 6 MG: 10 INJECTION, SOLUTION INTRAMUSCULAR; INTRAVENOUS at 17:28

## 2024-07-05 RX ADMIN — ACETAMINOPHEN 1000 MG: 500 TABLET ORAL at 19:05

## 2024-07-05 RX ADMIN — LORAZEPAM 1 MG: 1 TABLET ORAL at 17:29

## 2024-07-05 RX ADMIN — IPRATROPIUM BROMIDE AND ALBUTEROL SULFATE 1 DOSE: .5; 3 SOLUTION RESPIRATORY (INHALATION) at 17:29

## 2024-07-05 RX ADMIN — SODIUM CHLORIDE, POTASSIUM CHLORIDE, SODIUM LACTATE AND CALCIUM CHLORIDE 1000 ML: 600; 310; 30; 20 INJECTION, SOLUTION INTRAVENOUS at 17:28

## 2024-07-05 RX ADMIN — ONDANSETRON 4 MG: 2 INJECTION INTRAMUSCULAR; INTRAVENOUS at 17:29

## 2024-07-05 ASSESSMENT — LIFESTYLE VARIABLES
HOW OFTEN DO YOU HAVE A DRINK CONTAINING ALCOHOL: MONTHLY OR LESS
HOW MANY STANDARD DRINKS CONTAINING ALCOHOL DO YOU HAVE ON A TYPICAL DAY: 1 OR 2

## 2024-07-05 ASSESSMENT — PAIN DESCRIPTION - DESCRIPTORS: DESCRIPTORS: ACHING

## 2024-07-05 ASSESSMENT — PAIN - FUNCTIONAL ASSESSMENT
PAIN_FUNCTIONAL_ASSESSMENT: 0-10
PAIN_FUNCTIONAL_ASSESSMENT: NONE - DENIES PAIN

## 2024-07-05 ASSESSMENT — PAIN SCALES - GENERAL
PAINLEVEL_OUTOF10: 3
PAINLEVEL_OUTOF10: 7

## 2024-07-05 ASSESSMENT — PAIN DESCRIPTION - LOCATION: LOCATION: HEAD

## 2024-07-05 NOTE — ED PROVIDER NOTES
[facility] EMERGENCY DEPT  EMERGENCY DEPARTMENT ENCOUNTER       Pt Name: Dana Gutierrez  MRN: [id number]  Birthdate 1975  Date of evaluation: 7/5/2024  PCP: Chana Gonzalez APRN - NP  Note Started: 12:26 AM 7/5/24     CHIEF COMPLAINT       Chief Complaint   Patient presents with    Asthma        HISTORY OF PRESENT ILLNESS: 1 or more elements      History From: Patient  HPI Limitations: None  Chronic Conditions: asthma, GLORIA, anxiety, HTN, chronic pain, RA  Social Determinants affecting Dx or Tx: none      Dana Gutierrez is a 48 y.o. female who presents to ED c/o shortness of breath. Sxs started this afternoon. Pt was outside in heat and reports the hot humid weather started to make her \"feel shaky.\" Pt was unsure if she was having anxiety or her asthma was flaring up. Pt used her albuterol nebs and Advair with no relief. Pt endorses a few minutes of substernal chest pain this morning at 0900 but this has since resolved. She endorses dry cough. No fever, chills, vomiting, wheezing, leg swelling.      Nursing Notes were all reviewed and agreed with or any disagreements were addressed in the HPI.    PAST HISTORY     Past Medical History:  Past Medical History:   Diagnosis Date    Allergies 1996    chronic    Anxiety and depression 2008    Asthma 1995    Breast cancer ([facility]) 2021    Right breast- surgery, no chemo or radiation tx    Carpal tunnel syndrome 2012    bilateral, had surgery    Chronic pain 2000    all over    GERD (gastroesophageal reflux disease) 2013    on meds    Hypertension 2005    IBS (irritable bowel syndrome) 2007    Kidney stones 2008    no surgery, left side    Lactose intolerance     Opitz-Guidry syndrome 2003    PAD (peripheral artery disease) ([facility])     denies    Prediabetes 2019    not on meds, recent hga1c 6.3 on 1/24/2023    RA (rheumatoid arthritis) ([facility]) 2000    Rheumatoid arthritis ([facility])     Sleep apnea 2020    uses CPAP    Stress incontinence     Urinary frequency 2013    Varicose vein 2006

## 2024-07-05 NOTE — ED TRIAGE NOTES
Patient arrives to ED with report of asthma at home, used inhaler and neb, was out in the heat this morning and thinks it triggered it.

## 2024-07-06 LAB
EKG ATRIAL RATE: 104 BPM
EKG DIAGNOSIS: NORMAL
EKG P AXIS: 57 DEGREES
EKG P-R INTERVAL: 180 MS
EKG Q-T INTERVAL: 344 MS
EKG QRS DURATION: 78 MS
EKG QTC CALCULATION (BAZETT): 452 MS
EKG R AXIS: -8 DEGREES
EKG T AXIS: 20 DEGREES
EKG VENTRICULAR RATE: 104 BPM

## 2024-07-06 PROCEDURE — 93010 ELECTROCARDIOGRAM REPORT: CPT | Performed by: INTERNAL MEDICINE

## 2024-07-07 LAB
BACTERIA SPEC CULT: NORMAL
CC UR VC: NORMAL
SERVICE CMNT-IMP: NORMAL

## 2025-01-24 ENCOUNTER — APPOINTMENT (OUTPATIENT)
Facility: HOSPITAL | Age: 50
End: 2025-01-24
Payer: MEDICAID

## 2025-01-24 ENCOUNTER — HOSPITAL ENCOUNTER (EMERGENCY)
Facility: HOSPITAL | Age: 50
Discharge: HOME | End: 2025-01-24
Attending: STUDENT IN AN ORGANIZED HEALTH CARE EDUCATION/TRAINING PROGRAM
Payer: MEDICAID

## 2025-01-24 VITALS
HEART RATE: 98 BPM | SYSTOLIC BLOOD PRESSURE: 142 MMHG | DIASTOLIC BLOOD PRESSURE: 93 MMHG | RESPIRATION RATE: 18 BRPM | TEMPERATURE: 98.4 F | BODY MASS INDEX: 32.86 KG/M2 | OXYGEN SATURATION: 100 % | HEIGHT: 55 IN | WEIGHT: 142 LBS

## 2025-01-24 DIAGNOSIS — J10.1 INFLUENZA A: Primary | ICD-10-CM

## 2025-01-24 LAB
ALBUMIN SERPL-MCNC: 3.4 G/DL (ref 3.4–5)
ALBUMIN/GLOB SERPL: 1 (ref 0.8–1.7)
ALP SERPL-CCNC: 107 U/L (ref 45–117)
ALT SERPL-CCNC: 34 U/L (ref 13–56)
ANION GAP SERPL CALC-SCNC: 3 MMOL/L (ref 3–18)
AST SERPL-CCNC: 41 U/L (ref 10–38)
BASOPHILS # BLD: 0.02 K/UL (ref 0–0.1)
BASOPHILS NFR BLD: 0.4 % (ref 0–2)
BILIRUB SERPL-MCNC: 0.4 MG/DL (ref 0.2–1)
BUN SERPL-MCNC: 14 MG/DL (ref 7–18)
BUN/CREAT SERPL: 23 (ref 12–20)
CALCIUM SERPL-MCNC: 8.8 MG/DL (ref 8.5–10.1)
CHLORIDE SERPL-SCNC: 107 MMOL/L (ref 100–111)
CO2 SERPL-SCNC: 28 MMOL/L (ref 21–32)
CREAT SERPL-MCNC: 0.61 MG/DL (ref 0.6–1.3)
DIFFERENTIAL METHOD BLD: ABNORMAL
EOSINOPHIL # BLD: 0.07 K/UL (ref 0–0.4)
EOSINOPHIL NFR BLD: 1.5 % (ref 0–5)
ERYTHROCYTE [DISTWIDTH] IN BLOOD BY AUTOMATED COUNT: 13.7 % (ref 11.6–14.5)
FLUAV RNA SPEC QL NAA+PROBE: DETECTED
FLUBV RNA SPEC QL NAA+PROBE: NOT DETECTED
GLOBULIN SER CALC-MCNC: 3.5 G/DL (ref 2–4)
GLUCOSE SERPL-MCNC: 115 MG/DL (ref 74–99)
HCT VFR BLD AUTO: 43.1 % (ref 35–45)
HGB BLD-MCNC: 14.6 G/DL (ref 12–16)
IMM GRANULOCYTES # BLD AUTO: 0.02 K/UL (ref 0–0.04)
IMM GRANULOCYTES NFR BLD AUTO: 0.4 % (ref 0–0.5)
LYMPHOCYTES # BLD: 0.56 K/UL (ref 0.9–3.3)
LYMPHOCYTES NFR BLD: 11.8 % (ref 21–52)
MAGNESIUM SERPL-MCNC: 2 MG/DL (ref 1.6–2.6)
MCH RBC QN AUTO: 31.8 PG (ref 24–34)
MCHC RBC AUTO-ENTMCNC: 33.9 G/DL (ref 31–37)
MCV RBC AUTO: 93.9 FL (ref 78–100)
MONOCYTES # BLD: 0.41 K/UL (ref 0.05–1.2)
MONOCYTES NFR BLD: 8.6 % (ref 3–10)
NEUTS SEG # BLD: 3.66 K/UL (ref 1.8–8)
NEUTS SEG NFR BLD: 77.3 % (ref 40–73)
NRBC # BLD: 0 K/UL (ref 0–0.01)
NRBC BLD-RTO: 0 PER 100 WBC
NT PRO BNP: 20 PG/ML (ref 0–450)
PLATELET # BLD AUTO: 200 K/UL (ref 135–420)
PMV BLD AUTO: 9.4 FL (ref 9.2–11.8)
POTASSIUM SERPL-SCNC: 3.7 MMOL/L (ref 3.5–5.5)
PROT SERPL-MCNC: 6.9 G/DL (ref 6.4–8.2)
RBC # BLD AUTO: 4.59 M/UL (ref 4.2–5.3)
SARS-COV-2 RNA RESP QL NAA+PROBE: NOT DETECTED
SODIUM SERPL-SCNC: 138 MMOL/L (ref 136–145)
SOURCE: ABNORMAL
TROPONIN I SERPL HS-MCNC: 5 NG/L (ref 0–54)
WBC # BLD AUTO: 4.7 K/UL (ref 4.6–13.2)

## 2025-01-24 PROCEDURE — 87636 SARSCOV2 & INF A&B AMP PRB: CPT

## 2025-01-24 PROCEDURE — 83880 ASSAY OF NATRIURETIC PEPTIDE: CPT

## 2025-01-24 PROCEDURE — 71045 X-RAY EXAM CHEST 1 VIEW: CPT

## 2025-01-24 PROCEDURE — 99285 EMERGENCY DEPT VISIT HI MDM: CPT

## 2025-01-24 PROCEDURE — 84484 ASSAY OF TROPONIN QUANT: CPT

## 2025-01-24 PROCEDURE — 6370000000 HC RX 637 (ALT 250 FOR IP): Performed by: STUDENT IN AN ORGANIZED HEALTH CARE EDUCATION/TRAINING PROGRAM

## 2025-01-24 PROCEDURE — 83735 ASSAY OF MAGNESIUM: CPT

## 2025-01-24 PROCEDURE — 80053 COMPREHEN METABOLIC PANEL: CPT

## 2025-01-24 PROCEDURE — 93005 ELECTROCARDIOGRAM TRACING: CPT | Performed by: STUDENT IN AN ORGANIZED HEALTH CARE EDUCATION/TRAINING PROGRAM

## 2025-01-24 PROCEDURE — 85025 COMPLETE CBC W/AUTO DIFF WBC: CPT

## 2025-01-24 RX ORDER — OSELTAMIVIR PHOSPHATE 75 MG/1
75 CAPSULE ORAL 2 TIMES DAILY
Qty: 10 CAPSULE | Refills: 0 | Status: SHIPPED | OUTPATIENT
Start: 2025-01-24 | End: 2025-01-29

## 2025-01-24 RX ORDER — OSELTAMIVIR PHOSPHATE 75 MG/1
75 CAPSULE ORAL
Status: COMPLETED | OUTPATIENT
Start: 2025-01-24 | End: 2025-01-24

## 2025-01-24 RX ORDER — OSELTAMIVIR PHOSPHATE 75 MG/1
75 CAPSULE ORAL 2 TIMES DAILY
Status: DISCONTINUED | OUTPATIENT
Start: 2025-01-24 | End: 2025-01-24

## 2025-01-24 RX ORDER — ACETAMINOPHEN 325 MG/1
650 TABLET ORAL
Status: COMPLETED | OUTPATIENT
Start: 2025-01-24 | End: 2025-01-24

## 2025-01-24 RX ORDER — OXYMETAZOLINE HYDROCHLORIDE 0.05 G/100ML
2 SPRAY NASAL
Status: COMPLETED | OUTPATIENT
Start: 2025-01-24 | End: 2025-01-24

## 2025-01-24 RX ORDER — CODEINE PHOSPHATE AND GUAIFENESIN 10; 100 MG/5ML; MG/5ML
5 SOLUTION ORAL
Status: COMPLETED | OUTPATIENT
Start: 2025-01-24 | End: 2025-01-24

## 2025-01-24 RX ORDER — IBUPROFEN 600 MG/1
600 TABLET, FILM COATED ORAL
Status: COMPLETED | OUTPATIENT
Start: 2025-01-24 | End: 2025-01-24

## 2025-01-24 RX ADMIN — OSELTAMIVIR PHOSPHATE 75 MG: 75 CAPSULE ORAL at 11:43

## 2025-01-24 RX ADMIN — Medication 2 SPRAY: at 10:36

## 2025-01-24 RX ADMIN — IBUPROFEN 600 MG: 600 TABLET, FILM COATED ORAL at 10:36

## 2025-01-24 RX ADMIN — GUAIFENESIN AND CODEINE PHOSPHATE 5 ML: 100; 10 SOLUTION ORAL at 10:36

## 2025-01-24 RX ADMIN — ACETAMINOPHEN 650 MG: 325 TABLET ORAL at 10:36

## 2025-01-24 ASSESSMENT — PAIN DESCRIPTION - LOCATION: LOCATION: HEAD

## 2025-01-24 ASSESSMENT — PAIN SCALES - GENERAL: PAINLEVEL_OUTOF10: 3

## 2025-01-24 NOTE — DISCHARGE INSTRUCTIONS
You were evaluated for influenza .  Based on your work-up it was deemed that she was stable for discharge.  Please  your medication of tamiflu which was prescribed to you.  Please follow-up with your primary care physician if you have any further concerns and go over your work-up.  If you experience any chest pain, shortness of breath, worsening abdominal pain, vomiting blood, worsening headache, seizures, or any worsening of your symptoms please return to the emergency department immediately.  If you have any pending results or any further questions please contact the emergency department at [phone]

## 2025-01-24 NOTE — ED TRIAGE NOTES
Pt in ED with c/o cough, headache, and fatigue onset yesterday. 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 Pt took an at home test for covid and came back negative ( but the test was )

## 2025-01-26 LAB
EKG ATRIAL RATE: 101 BPM
EKG DIAGNOSIS: NORMAL
EKG P AXIS: 46 DEGREES
EKG P-R INTERVAL: 152 MS
EKG Q-T INTERVAL: 338 MS
EKG QRS DURATION: 76 MS
EKG QTC CALCULATION (BAZETT): 438 MS
EKG R AXIS: -15 DEGREES
EKG T AXIS: 26 DEGREES
EKG VENTRICULAR RATE: 101 BPM

## 2025-01-31 NOTE — ED PROVIDER NOTES
EMERGENCY DEPARTMENT HISTORY AND PHYSICAL EXAM    7:52 PM      Date: 1/24/2025  Patient Name: Dana Gutierrez    History of Presenting Illness     Chief Complaint   Patient presents with    Headache    Fatigue       History From: Patient    Dana Gutierrez is a 49 y.o. female   HPI     49-year-old female with history of breast cancer, hypertension, GERD, kidney stones who presents with fever, headache, cough.  Patient her workplace status multiple people have flulike symptoms.  Patient took COVID test however was negative.  However patient is having worsening of her symptoms hence she is going to the ED for further evaluation.  Denies any changes in meds, sick contacts, or any other changes.  When assessing ROS she denies any chest pain, shortness of breath, abdominal pain, change in bowel movement, or any other changes.    Nursing Notes were all reviewed and agreed with or any disagreements were addressed in the HPI.    PCP: Chana Gonzalez APRN - NP    No current facility-administered medications for this encounter.     Current Outpatient Medications   Medication Sig Dispense Refill    albuterol sulfate HFA (PROVENTIL;VENTOLIN;PROAIR) 108 (90 Base) MCG/ACT inhaler Inhale 2 puffs into the lungs every 6 hours as needed for Wheezing 18 g 0    Spacer/Aero-Holding Chambers (COMPACT SPACE CHAMBER) CESAR Please provide one adult spacer to be used with HFA 1 each 0    EPINEPHrine (SYMJEPI) 0.3 MG/0.3ML SOSY injection Inject 0.3 mLs into the muscle as needed      citalopram (CELEXA) 20 MG tablet Take 1 tablet by mouth daily      cyclobenzaprine (FLEXERIL) 5 MG tablet daily as needed      hydroCHLOROthiazide (MICROZIDE) 12.5 MG capsule Take 12.5 mg by mouth daily      fluticasone-salmeterol (ADVAIR HFA) 230-21 MCG/ACT inhaler Inhale 2 puffs into the lungs 2 times daily      losartan (COZAAR) 25 MG tablet Take 1 tablet by mouth daily      montelukast (SINGULAIR) 10 MG tablet Take 10 mg by mouth nightly      omeprazole

## 2025-06-04 ENCOUNTER — HOSPITAL ENCOUNTER (EMERGENCY)
Facility: HOSPITAL | Age: 50
Discharge: HOME | End: 2025-06-05
Attending: EMERGENCY MEDICINE
Payer: MEDICAID

## 2025-06-04 ENCOUNTER — APPOINTMENT (OUTPATIENT)
Facility: HOSPITAL | Age: 50
End: 2025-06-04
Payer: MEDICAID

## 2025-06-04 DIAGNOSIS — F10.929 ACUTE ALCOHOLIC INTOXICATION WITH COMPLICATION: Primary | ICD-10-CM

## 2025-06-04 LAB
ALBUMIN SERPL-MCNC: 3.6 G/DL (ref 3.4–5)
ALBUMIN/GLOB SERPL: 1 (ref 0.8–1.7)
ALP SERPL-CCNC: 129 U/L (ref 45–117)
ALT SERPL-CCNC: 30 U/L (ref 10–35)
ANION GAP SERPL CALC-SCNC: 15 MMOL/L (ref 3–18)
AST SERPL-CCNC: 43 U/L (ref 10–38)
BASOPHILS # BLD: 0 K/UL (ref 0–0.1)
BASOPHILS NFR BLD: 0 % (ref 0–2)
BILIRUB SERPL-MCNC: 0.2 MG/DL (ref 0.2–1)
BUN SERPL-MCNC: 12 MG/DL (ref 6–23)
BUN/CREAT SERPL: 18 (ref 12–20)
CALCIUM SERPL-MCNC: 9.1 MG/DL (ref 8.5–10.1)
CHLORIDE SERPL-SCNC: 107 MMOL/L (ref 98–107)
CO2 SERPL-SCNC: 21 MMOL/L (ref 21–32)
CREAT SERPL-MCNC: 0.64 MG/DL (ref 0.6–1.3)
DIFFERENTIAL METHOD BLD: ABNORMAL
EOSINOPHIL # BLD: 0.27 K/UL (ref 0–0.4)
EOSINOPHIL NFR BLD: 3 % (ref 0–5)
ERYTHROCYTE [DISTWIDTH] IN BLOOD BY AUTOMATED COUNT: 12.6 % (ref 11.6–14.5)
ETHANOL SERPL-MCNC: 279 MG/DL (ref 0–0.08)
GLOBULIN SER CALC-MCNC: 3.6 G/DL (ref 2–4)
GLUCOSE SERPL-MCNC: 142 MG/DL (ref 74–108)
HCT VFR BLD AUTO: 44.2 % (ref 35–45)
HGB BLD-MCNC: 14.6 G/DL (ref 12–16)
IMM GRANULOCYTES # BLD AUTO: 0 K/UL
IMM GRANULOCYTES NFR BLD AUTO: 0 %
LYMPHOCYTES # BLD: 6.03 K/UL (ref 0.9–3.6)
LYMPHOCYTES NFR BLD: 67 % (ref 21–52)
MAGNESIUM SERPL-MCNC: 2.3 MG/DL (ref 1.6–2.6)
MCH RBC QN AUTO: 30.7 PG (ref 24–34)
MCHC RBC AUTO-ENTMCNC: 33 G/DL (ref 31–37)
MCV RBC AUTO: 93.1 FL (ref 78–100)
MONOCYTES # BLD: 0.18 K/UL (ref 0.05–1.2)
MONOCYTES NFR BLD: 2 % (ref 3–10)
NEUTS SEG # BLD: 2.52 K/UL (ref 1.8–8)
NEUTS SEG NFR BLD: 28 % (ref 40–73)
NRBC # BLD: 0 K/UL (ref 0–0.01)
NRBC BLD-RTO: 0 PER 100 WBC
PLATELET # BLD AUTO: 269 K/UL (ref 135–420)
PLATELET COMMENT: ABNORMAL
PMV BLD AUTO: 9.2 FL (ref 9.2–11.8)
POTASSIUM SERPL-SCNC: 3.7 MMOL/L (ref 3.5–5.5)
PROT SERPL-MCNC: 7.2 G/DL (ref 6.4–8.2)
RBC # BLD AUTO: 4.75 M/UL (ref 4.2–5.3)
RBC MORPH BLD: ABNORMAL
SODIUM SERPL-SCNC: 142 MMOL/L (ref 136–145)
TROPONIN T SERPL HS-MCNC: <6 NG/L (ref 0–14)
WBC # BLD AUTO: 9 K/UL (ref 4.6–13.2)
WBC MORPH BLD: ABNORMAL

## 2025-06-04 PROCEDURE — 82077 ASSAY SPEC XCP UR&BREATH IA: CPT

## 2025-06-04 PROCEDURE — 71045 X-RAY EXAM CHEST 1 VIEW: CPT

## 2025-06-04 PROCEDURE — 83735 ASSAY OF MAGNESIUM: CPT

## 2025-06-04 PROCEDURE — 2580000003 HC RX 258: Performed by: EMERGENCY MEDICINE

## 2025-06-04 PROCEDURE — 80053 COMPREHEN METABOLIC PANEL: CPT

## 2025-06-04 PROCEDURE — 99285 EMERGENCY DEPT VISIT HI MDM: CPT

## 2025-06-04 PROCEDURE — 93005 ELECTROCARDIOGRAM TRACING: CPT | Performed by: EMERGENCY MEDICINE

## 2025-06-04 PROCEDURE — 85025 COMPLETE CBC W/AUTO DIFF WBC: CPT

## 2025-06-04 PROCEDURE — 84484 ASSAY OF TROPONIN QUANT: CPT

## 2025-06-04 RX ORDER — 0.9 % SODIUM CHLORIDE 0.9 %
1000 INTRAVENOUS SOLUTION INTRAVENOUS ONCE
Status: DISCONTINUED | OUTPATIENT
Start: 2025-06-04 | End: 2025-06-04

## 2025-06-04 RX ORDER — 0.9 % SODIUM CHLORIDE 0.9 %
1000 INTRAVENOUS SOLUTION INTRAVENOUS ONCE
Status: COMPLETED | OUTPATIENT
Start: 2025-06-04 | End: 2025-06-05

## 2025-06-04 RX ADMIN — SODIUM CHLORIDE 1000 ML: 0.9 INJECTION, SOLUTION INTRAVENOUS at 22:31

## 2025-06-05 VITALS
DIASTOLIC BLOOD PRESSURE: 75 MMHG | HEIGHT: 59 IN | RESPIRATION RATE: 19 BRPM | TEMPERATURE: 98.3 F | HEART RATE: 89 BPM | OXYGEN SATURATION: 99 % | BODY MASS INDEX: 29.03 KG/M2 | WEIGHT: 144 LBS | SYSTOLIC BLOOD PRESSURE: 122 MMHG

## 2025-06-05 LAB — TROPONIN T SERPL HS-MCNC: <6 NG/L (ref 0–14)

## 2025-06-05 NOTE — DISCHARGE INSTRUCTIONS
Thank you for choosing [facility]'s Emergency Department for your care. It is our privilege to provide excellent care for you in your time of need. In the next several days, you may receive a survey via mail or email about your experience with our team. We would appreciate you taking a few minutes to complete the survey, as we use this information to learn what we have done well and what we could be doing better. Thank you for trusting us with your care.    -----------------------------------------------------------------------------  Below you will find a list of your tests from today's visit.   Labs  Recent Results (from the past 12 hours)   CBC with Auto Differential    Collection Time: 06/04/25 10:25 PM   Result Value Ref Range    WBC 9.0 4.6 - 13.2 K/uL    RBC 4.75 4.20 - 5.30 M/uL    Hemoglobin 14.6 12.0 - 16.0 g/dL    Hematocrit 44.2 35.0 - 45.0 %    MCV 93.1 78.0 - 100.0 FL    MCH 30.7 24.0 - 34.0 PG    MCHC 33.0 31.0 - 37.0 g/dL    RDW 12.6 11.6 - 14.5 %    Platelets 269 135 - 420 K/uL    MPV 9.2 9.2 - 11.8 FL    Nucleated RBCs 0.0 0  WBC    nRBC 0.00 0.00 - 0.01 K/uL    Neutrophils % 28 (L) 40 - 73 %    Lymphocytes % 67 (H) 21 - 52 %    Monocytes % 2 (L) 3 - 10 %    Eosinophils % 3 0 - 5 %    Basophils % 0 0 - 2 %    Immature Granulocytes % 0 %    Neutrophils Absolute 2.52 1.8 - 8.0 K/UL    Lymphocytes Absolute 6.03 (H) 0.9 - 3.6 K/UL    Monocytes Absolute 0.18 0.05 - 1.2 K/UL    Eosinophils Absolute 0.27 0.0 - 0.4 K/UL    Basophils Absolute 0.00 0.0 - 0.1 K/UL    Immature Granulocytes Absolute 0.00 K/UL    Differential Type MANUAL      Platelet Comment ADEQUATE PLATELETS      RBC Comment NORMOCYTIC, NORMOCHROMIC      WBC Comment ATYPICAL LYMPHOCYTES PRESENT     Comprehensive Metabolic Panel    Collection Time: 06/04/25 10:25 PM   Result Value Ref Range    Sodium 142 136 - 145 mmol/L    Potassium 3.7 3.5 - 5.5 mmol/L    Chloride 107 98 - 107 mmol/L    CO2 21 21 - 32 mmol/L    Anion  Gap 15 3 - 18 mmol/L    Glucose 142 (H) 74 - 108 mg/dL    BUN 12 6 - 23 MG/DL    Creatinine 0.64 0.60 - 1.30 MG/DL    BUN/Creatinine Ratio 18 12 - 20      Est, Glom Filt Rate >90 >60 ml/min/1.73m2    Calcium 9.1 8.5 - 10.1 MG/DL    Total Bilirubin 0.2 0.2 - 1.0 MG/DL    ALT 30 10 - 35 U/L    AST 43 (H) 10 - 38 U/L    Alk Phosphatase 129 (H) 45 - 117 U/L    Total Protein 7.2 6.4 - 8.2 g/dL    Albumin 3.6 3.4 - 5.0 g/dL    Globulin 3.6 2.0 - 4.0 g/dL    Albumin/Globulin Ratio 1.0 0.8 - 1.7     Magnesium    Collection Time: 06/04/25 10:25 PM   Result Value Ref Range    Magnesium 2.3 1.6 - 2.6 mg/dL   Troponin    Collection Time: 06/04/25 10:25 PM   Result Value Ref Range    Troponin T <6.0 0.0 - 14.0 ng/L   Ethanol    Collection Time: 06/04/25 10:25 PM   Result Value Ref Range    Ethanol Lvl 279 MG/DL   EKG 12 Lead    Collection Time: 06/04/25 10:39 PM   Result Value Ref Range    Ventricular Rate 103 BPM    Atrial Rate 103 BPM    P-R Interval 160 ms    QRS Duration 74 ms    Q-T Interval 332 ms    QTc Calculation (Bazett) 434 ms    P Axis 58 degrees    R Axis -26 degrees    T Axis 28 degrees    Diagnosis       Sinus tachycardia  Cannot rule out Anterior infarct , age undetermined  Abnormal ECG  When compared with ECG of 24-JAN-2025 10:07,  No significant change was found     Troponin    Collection Time: 06/04/25 11:39 PM   Result Value Ref Range    Troponin T <6.0 0.0 - 14.0 ng/L        Radiologic Studies  XR CHEST 1 VIEW   Final Result   No acute process.         Electronically signed by Reilly Loza        -----------------------------------------------------------------------------  The evaluation and treatment you received in the Emergency Department were for an urgent problem. It is important that you follow-up with a doctor, nurse practitioner, or physician assistant to: 1. confirm your diagnosis, 2. re-evaluate changes in your illness and treatment, and 3. for ongoing care. In some cases, specialist follow up

## 2025-06-05 NOTE — ED TRIAGE NOTES
Patient BIBA for unresponsiveness. Patient reports she was drinking her beer and then passed out. EMS reports patient being unresponsive, used ammonia inhalant and patient became responsive immediately. Patient reports having 2-16oz beers. Nasal trumpet placed en-route due to becoming unresponsive again. Patient now A&Ox4.

## 2025-06-05 NOTE — ED PROVIDER NOTES
EMERGENCY DEPARTMENT CONTINUING CARE NOTE      THIS NOTE IS DOCUMENTATION OF CARE PROVIDED AFTER CARE OF THE PATIENT WAS TRANSFERRED TO ME. PLEASE SEE THE NOTE BY THE INITIAL ED PROVIDER FOR DETAILS SURROUNDING THE H&P AND INITIAL MEDICAL DECISION MAKING.    Patient Name: Dana Gutierrez  MRN: [id number]  YOB: 1975  Provider: Balta Lechuga MD  PCP: Chana Gonzalez APRN - NP   Date of transfer of care: 6/4/25  ED Bed: 9    Diagnostic Study Results     LABS:  Recent Results (from the past 12 hours)   CBC with Auto Differential    Collection Time: 06/04/25 10:25 PM   Result Value Ref Range    WBC 9.0 4.6 - 13.2 K/uL    RBC 4.75 4.20 - 5.30 M/uL    Hemoglobin 14.6 12.0 - 16.0 g/dL    Hematocrit 44.2 35.0 - 45.0 %    MCV 93.1 78.0 - 100.0 FL    MCH 30.7 24.0 - 34.0 PG    MCHC 33.0 31.0 - 37.0 g/dL    RDW 12.6 11.6 - 14.5 %    Platelets 269 135 - 420 K/uL    MPV 9.2 9.2 - 11.8 FL    Nucleated RBCs 0.0 0  WBC    nRBC 0.00 0.00 - 0.01 K/uL    Neutrophils % 28 (L) 40 - 73 %    Lymphocytes % 67 (H) 21 - 52 %    Monocytes % 2 (L) 3 - 10 %    Eosinophils % 3 0 - 5 %    Basophils % 0 0 - 2 %    Immature Granulocytes % 0 %    Neutrophils Absolute 2.52 1.8 - 8.0 K/UL    Lymphocytes Absolute 6.03 (H) 0.9 - 3.6 K/UL    Monocytes Absolute 0.18 0.05 - 1.2 K/UL    Eosinophils Absolute 0.27 0.0 - 0.4 K/UL    Basophils Absolute 0.00 0.0 - 0.1 K/UL    Immature Granulocytes Absolute 0.00 K/UL    Differential Type MANUAL      Platelet Comment ADEQUATE PLATELETS      RBC Comment NORMOCYTIC, NORMOCHROMIC      WBC Comment ATYPICAL LYMPHOCYTES PRESENT     Comprehensive Metabolic Panel    Collection Time: 06/04/25 10:25 PM   Result Value Ref Range    Sodium 142 136 - 145 mmol/L    Potassium 3.7 3.5 - 5.5 mmol/L    Chloride 107 98 - 107 mmol/L    CO2 21 21 - 32 mmol/L    Anion Gap 15 3 - 18 mmol/L    Glucose 142 (H) 74 - 108 mg/dL    BUN 12 6 - 23 MG/DL    Creatinine 0.64 0.60 - 1.30 MG/DL    BUN/Creatinine Ratio 18 12 - 20

## 2025-06-05 NOTE — ED PROVIDER NOTES
[facility]  EMERGENCY DEPARTMENT ENCOUNTER    Patient Name: Dana Gutierrez  MRN: [id number]  YOB: 1975  Provider: TUSHAR Barnard MD am the primary clinician of record.  Time/Date of evaluation: 10:24 PM EDT on 6/4/25    History of Presenting Illness     History provided by: Patient  History is limited by: Nothing   Evaluated in room: 9    Chief Complaint: Unresponsiveness    HISTORY:  Dana Gutierrez is a 49 y.o. female presenting via EMS.  Currently she was drinking beer in her chair when she became unresponsive.  Her  tried to shake her awake but she did not so he called EMS.  They found she woke up immediately with ammonia salt.  Point-of-care glucose 145.  EMS states that she hold her breath for a significant period of time and appeared purposeful.  No history of seizure.  History of prediabetes.  No history of heart problems.  She says she feels fine now.  Denies any drug use tonight but does admit to drinking 2x 16 ounce beers.  Says she has been under a lot of stress recently with death of his sister last week and son was recently in the Emergency Department for seizures    Nursing Notes were all reviewed and agreed with or any disagreements were addressed in the HPI.    Past History     PAST MEDICAL HISTORY:  Past Medical History:   Diagnosis Date    Allergies 1996    chronic    Anxiety and depression 2008    Asthma 1995    Breast cancer ([facility]) 2021    Right breast- surgery, no chemo or radiation tx    Carpal tunnel syndrome 2012    bilateral, had surgery    Chronic pain 2000    all over    GERD (gastroesophageal reflux disease) 2013    on meds    Hypertension 2005    IBS (irritable bowel syndrome) 2007    Kidney stones 2008    no surgery, left side    Lactose intolerance     Opitz-Guidry syndrome 2003    PAD (peripheral artery disease)     denies    Prediabetes 2019    not on meds, recent hga1c 6.3 on 1/24/2023    RA (rheumatoid arthritis) ([facility]) 2000     cyclobenzaprine (FLEXERIL) 5 MG tablet daily as needed      hydroCHLOROthiazide (MICROZIDE) 12.5 MG capsule Take 12.5 mg by mouth daily      fluticasone-salmeterol (ADVAIR HFA) 230-21 MCG/ACT inhaler Inhale 2 puffs into the lungs 2 times daily      losartan (COZAAR) 25 MG tablet Take 1 tablet by mouth daily      montelukast (SINGULAIR) 10 MG tablet Take 10 mg by mouth nightly      omeprazole (PRILOSEC) 10 MG delayed release capsule Take 1 capsule by mouth daily      busPIRone (BUSPAR) 5 MG tablet TAKE 1 TABLET BY MOUTH 3 TIMES DAILY.      celecoxib (CELEBREX) 200 MG capsule Take 1 capsule by mouth daily      cyanocobalamin 1000 MCG tablet Take 1,000 mcg by mouth daily      fexofenadine (ALLEGRA) 180 MG tablet Take by mouth daily         ALLERGIES:  Allergies   Allergen Reactions    Latex Hives and Itching    Dust Mite Extract Cough, Hives, Shortness Of Breath and Rash    Peanut-Containing Drug Products Anaphylaxis, Other (See Comments) and Nausea And Vomiting     Other reaction(s): Other (comments)   States found out in allergy testing    Other reaction(s): Other (comments)   States found out in allergy testing   Positive reaction to allergy skin testing   Other reaction(s): unknown   Positive reaction to allergy skin testing   Other reaction(s): Unknown (comments)   Other reaction(s): unknown   Positive reaction to allergy skin testing   Positive reaction to allergy skin testing      Other reaction(s): unknown   Positive reaction to allergy skin testing   Other reaction(s): Unknown (comments)   Positive reaction to allergy skin testing    Positive reaction to allergy skin testing    Coconut (Cocos Nucifera) Other (See Comments)     Other reaction(s): gi distress, mild rash/itching  Other reaction(s): gi distress, mild rash/itching    Coconut Fatty Acid Itching, Rash and Other (See Comments)     Other reaction(s): gi distress, mild rash/itching  GI distress    Other reaction(s): gi distress, mild rash/itching

## 2025-06-07 LAB
EKG ATRIAL RATE: 103 BPM
EKG DIAGNOSIS: NORMAL
EKG P AXIS: 58 DEGREES
EKG P-R INTERVAL: 160 MS
EKG Q-T INTERVAL: 332 MS
EKG QRS DURATION: 74 MS
EKG QTC CALCULATION (BAZETT): 434 MS
EKG R AXIS: -26 DEGREES
EKG T AXIS: 28 DEGREES
EKG VENTRICULAR RATE: 103 BPM

## 2025-06-07 PROCEDURE — 93010 ELECTROCARDIOGRAM REPORT: CPT | Performed by: INTERNAL MEDICINE

## (undated) DEVICE — SINGLE-USE BIOPSY FORCEPS: Brand: RADIAL JAW 4

## (undated) DEVICE — NEEDLE HYPO 27GA L1.25IN GRY POLYPR HUB S STL REG BVL STR

## (undated) DEVICE — STERILE COTTON TIPPED APPLICATORS: Brand: PURITAN

## (undated) DEVICE — IV START KIT: Brand: MEDLINE

## (undated) DEVICE — SPLINT 1524055 DOYLE II AIRWAY SET: Brand: DOYLE II ™

## (undated) DEVICE — SUTURE ABSORBABLE MONOFILAMENT 4-0 SC-1 18 IN PLN GUT 1824H

## (undated) DEVICE — SUTURE CHROMIC GUT SZ 4-0 L18IN ABSRB BRN L13MM P-3 3/8 CIR 1654G

## (undated) DEVICE — SNARE ENDOSCP POLYP MED STD AD 2.4X27X240 CM 2.8 MM OVL SENS

## (undated) DEVICE — CATHETER IV 18GA L1.16IN OD1.27-1.3462MM ID0.9398-1.016MM

## (undated) DEVICE — CATHETER IV 24GA L0.75IN OD0.6604-0.7366MM

## (undated) DEVICE — SOLUTION IRRIG 500ML 0.9% SOD CHLO USP POUR PLAS BTL

## (undated) DEVICE — KIT,ANTI FOG,W/SPONGE & FLUID,SOFT PACK: Brand: MEDLINE

## (undated) DEVICE — ENDOSCOPIC KIT COMPLIANCE ENDOKIT

## (undated) DEVICE — STRAP,POSITIONING,KNEE/BODY,FOAM,4X60": Brand: MEDLINE

## (undated) DEVICE — SOLUTION IV 250ML 0.9% SOD CHL CLR INJ FLX BG CONT PRT CLSR

## (undated) DEVICE — ENT PACK: Brand: MEDLINE INDUSTRIES, INC.

## (undated) DEVICE — SUTURE PROL SZ 2-0 L30IN NONABSORBABLE BLU L26MM CT-2 1/2 8411H

## (undated) DEVICE — SPONGE,NEURO,0.5"X3",XR,STRL,LF,10/PK: Brand: MEDLINE

## (undated) DEVICE — CUFF BLD PRSS AD CLTH SGL TB W/ BAYNT CONN ROUNDED CORNER

## (undated) DEVICE — SYSTEM REPROC CBL 3 LD DISPOSABLE

## (undated) DEVICE — CATHETER IV 20GA L1.16IN OD1.0414-1.1176MM ID0.762-0.8382MM

## (undated) DEVICE — TRAP ENDOSCP POLYP 2 CHMBR DRAWER TYP

## (undated) DEVICE — GARMENT,MEDLINE,DVT,INT,CALF,MED, GEN2: Brand: MEDLINE

## (undated) DEVICE — CATHETER IV 22GA L1IN OD0.8382-0.9144MM ID0.6096-0.6858MM

## (undated) DEVICE — 3M™ BAIR PAWS FLEX™ WARMING GOWN, SMALL, 20 PER CASE 81103: Brand: BAIR PAWS™

## (undated) DEVICE — 4-PORT MANIFOLD: Brand: NEPTUNE 2

## (undated) DEVICE — CONTAINER SPEC 20 ML LID NEUT BUFF FORMALIN 10 % POLYPR STS

## (undated) DEVICE — HEAD DONUT FOAM POSITIONER: Brand: CARDINAL HEALTH